# Patient Record
(demographics unavailable — no encounter records)

---

## 2024-10-17 NOTE — REASON FOR VISIT
[Patient] : Patient [Other: _____] : [unfilled] [TextBox_17] : Son [FreeTextEntry1] : med management/ follow-up

## 2024-10-17 NOTE — FAMILY HISTORY
[FreeTextEntry1] : Family hx of patient's niece who is dealing with "mental health" issue Denies all other family history of suicide, substance abuse
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
## 2024-10-17 NOTE — PLAN
[Yes. details: ___] : Yes, [unfilled] [Medication education provided] : Medication education provided. [Rationale for medication choices, possible risks/precautions, benefits, alternative treatment choices, and consequences of non-treatment discussed] : Rationale for medication choices, possible risks/precautions, benefits, alternative treatment choices, and consequences of non-treatment discussed with patient/family/caregiver  [FreeTextEntry5] : -Continue Olanzapine 10mg PO Daily for mood -Consider switching to Lybalvi or adding another medication for weight management  -F/u with therapist Holly -F/u in 6-8 weeks

## 2024-10-17 NOTE — PHYSICAL EXAM
[Well groomed] : well groomed [Cooperative] : cooperative [Euthymic] : euthymic [Full] : full [Clear] : clear [Linear/Goal Directed] : linear/goal directed [None] : none [None Reported] : none reported [Average] : average [WNL] : within normal limits [FreeTextEntry8] : "good"  [de-identified] : fair [de-identified] : fair

## 2024-10-17 NOTE — SOCIAL HISTORY
[FreeTextEntry1] : Pt is [place], , with 4 children (son-34yo, daughters- 31, 29, 26) domiciled at home with her  and her 28 y/o daughter, with her 30 y/o daughter living upstairs, Moved from [facility] 34 years ago

## 2024-11-15 NOTE — PLAN
[FreeTextEntry2] : Domain for Problem/Need I: Mental Health.    Problem: Bipolar disorder, anxiety and depression.    goal (In patient's words): "i want my mood stability and i want to go back to how i used to be happy and healthy".    Objective A: Patient will note improved awareness of triggers that result in ineffective thought, mood, behavior changes and report to the therapist and psychiatrist during sessions.    Objective B: Patient will learn to use coping skills to help manage depression and anxiety and will lower the PHQ and ARETHA 7 score by 3 points until the next treatment plan.     Problem/Need II:  Domain for Problem/Need II: Physical Health.  Problem: Bipolar disorder, anxiety and depression, cholesterol.  Objective A: Maintain annual physical and blood work, attend all appointments and take medications as prescribed.  Objective B: Go outside a much as you can, stay physical by going out for walks daily  [Cognitive and/or Behavior Therapy] : Cognitive and/or Behavior Therapy  [Motivational Interviewing] : Motivational Interviewing  [Psychoeducation] : Psychoeducation  [Supportive Therapy] : Supportive Therapy [de-identified] : Session began at 12pm and ended at 12:30pm    The patient is seen today in person individual therapy. patient is being seen by Doctor Jay Jay for psychiatric medication. Patient was referred to OPD after IPP admission at [facility].   Patient presents stable and in good mood, she is spending time with her family and has great family support from her spouse as well as her kids and grandkids. Today patient is asking to stop therapy as she states she no longer has the need to talk to this writer as she is doing very well, is happy and has her family to talk to and for support. Patient is encouraged to continue to see the psychiatrist and to take her medications as prescribed and is encouraged to call this writer in future if she needs a session.      During today's session, motivational interviewing, used CBT, reflective lesson and active listening and supportive therapy. The treatment plan will be reviewed and completed with the patient during the next session. Treatment plan completed with the patient. Her goal is to remain stable and healthy, to learn coping skills to reduce anxiety and depression.          Patient denies S/H/I or self harming behaviors.    [FreeTextEntry1] : Patient will be seen weekly for now.
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
## 2024-11-15 NOTE — PHYSICAL EXAM
[Cooperative] : cooperative [Anxious] : anxious [Full] : full [Clear] : clear [Racing] : racing [Preoccupations/Ruminations] : preoccupations/ruminations [Average] : average [WNL] : within normal limits [Mild] : mild [Individual reports tobacco use during the last 30 days?] : Individual reports tobacco use during the last 30 days? Yes [Individual reports use of the following tobacco products during the last 30 days?] : Individual reports use of the following tobacco products during the last 30 days? Yes -  [Cigarettes] : Cigarettes [Individual reports current use of tobacco cessation medication or nicotine replacement therapy?] : Individual reports current use of tobacco cessation medication or nicotine replacement therapy? No [Was tobacco cessation medication and/or nicotine replacement therapy recommended?] : Was tobacco cessation medication and/or nicotine replacement therapy recommended? Yes [Does individual accept referral to MD for cessation medication or NRT?] : Does individual accept referral to MD for cessation medication or NRT? No

## 2024-11-18 NOTE — DISCUSSION/SUMMARY
[FreeTextEntry1] : Patient seen today for therapy session. patient is doing well and states she no longer wishes to attend therapy sessions as she is doing well. she states she will continue to see the doctor for med management. Doctor is aware- patient will be meds only for now.

## 2024-12-05 NOTE — SOCIAL HISTORY
[FreeTextEntry1] : Pt is [place], , with 4 children (son-32yo, daughters- 31, 29, 26) domiciled at home with her  and her 30 y/o daughter, with her 30 y/o daughter living upstairs, Moved from [facility] 34 years ago

## 2024-12-05 NOTE — PLAN
[Yes. details: ___] : Yes, [unfilled] [Medication education provided] : Medication education provided. [Rationale for medication choices, possible risks/precautions, benefits, alternative treatment choices, and consequences of non-treatment discussed] : Rationale for medication choices, possible risks/precautions, benefits, alternative treatment choices, and consequences of non-treatment discussed with patient/family/caregiver  [FreeTextEntry5] : -Increase to Olanzapine 15mg PO Daily for mood -Consider switching to Lybalvi or adding another medication for weight management  -F/u with therapist Holly -F/u in 1-2 weeks
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
## 2024-12-05 NOTE — PHYSICAL EXAM
[Well groomed] : well groomed [Cooperative] : cooperative [Depressed] : depressed [Anxious] : anxious [Constricted] : constricted [Clear] : clear [Linear/Goal Directed] : linear/goal directed [None] : none [None Reported] : none reported [Average] : average [WNL] : within normal limits [FreeTextEntry8] : "not good" [de-identified] : fair [de-identified] : fair
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
## 2024-12-05 NOTE — DISCUSSION/SUMMARY
[FreeTextEntry1] : Patient is in person seeing Dr Goyal, as per Dr Goyal she isn't doing well, is depressed, having passive S/I thoughts, afraid of the future. Dr Goyal invited me to join video session to talk to the patient and offer a session. Writer will be seeing the patient on Dec 10 at 3 pm in person. Last time patient ended therapy with this writer as she was doing well but now is open to restart.

## 2024-12-12 NOTE — SOCIAL HISTORY
[FreeTextEntry1] : Pt is Irish, , with 4 children (son-32yo, daughters- 31, 29, 26) domiciled at home with her  and her 28 y/o daughter, with her 32 y/o daughter living upstairs, Moved from [facility] 34 years ago

## 2024-12-12 NOTE — PHYSICAL EXAM
[Well groomed] : well groomed [Cooperative] : cooperative [Depressed] : depressed [Constricted] : constricted [Clear] : clear [Linear/Goal Directed] : linear/goal directed [None] : none [None Reported] : none reported [Average] : average [WNL] : within normal limits [FreeTextEntry8] : "I feel jealous" [de-identified] : fair [de-identified] : fair
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
## 2024-12-12 NOTE — PLAN
[Yes. details: ___] : Yes, [unfilled] [Medication education provided] : Medication education provided. [Rationale for medication choices, possible risks/precautions, benefits, alternative treatment choices, and consequences of non-treatment discussed] : Rationale for medication choices, possible risks/precautions, benefits, alternative treatment choices, and consequences of non-treatment discussed with patient/family/caregiver  [FreeTextEntry5] : -Increase to Olanzapine 20mg PO Daily for mood -If pt doesn't improve can change to another medication or add mood stabilizer  -Consider switching to Lybalvi or adding another medication for weight management if pt stays on Olanzapine -F/u Labs and EKG  -F/u with therapist Holly -F/u in 1-2 weeks
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
## 2024-12-25 NOTE — DISCUSSION/SUMMARY
[FreeTextEntry1] : Jacque received notification that patient went to ER yesterday. Writer spoke to her son this morning who stated that patient is extremely anxious, cant sit still and asked to go in. While at ER they prescribed her Hydroxyzine. Patient is being seen by this writer tomorrow morning.

## 2025-01-02 NOTE — SOCIAL HISTORY
[FreeTextEntry1] : Pt is Persian, , with 4 children (son-34yo, daughters- 31, 29, 26) domiciled at home with her  and her 28 y/o daughter, with her 30 y/o daughter living upstairs, Moved from [facility] 34 years ago

## 2025-01-02 NOTE — SOCIAL HISTORY
[FreeTextEntry1] : Pt is Luxem[place], , with 4 children (son-34yo, daughters- 31, 29, 26) domiciled at home with her  and her 30 y/o daughter, with her 32 y/o daughter living upstairs, Moved from [facility] 34 years ago

## 2025-01-02 NOTE — SOCIAL HISTORY
[FreeTextEntry1] : Pt is Pashto, , with 4 children (son-32yo, daughters- 31, 29, 26) domiciled at home with her  and her 28 y/o daughter, with her 30 y/o daughter living upstairs, Moved from [facility] 34 years ago

## 2025-01-02 NOTE — PHYSICAL EXAM
[Well groomed] : well groomed [Cooperative] : cooperative [Depressed] : depressed [Constricted] : constricted [Clear] : clear [Linear/Goal Directed] : linear/goal directed [None] : none [None Reported] : none reported [WNL] : within normal limits [Average] : average [FreeTextEntry8] : "I feel jealous" [de-identified] : fair [de-identified] : fair

## 2025-01-02 NOTE — PLAN
[Yes. details: ___] : Yes, [unfilled] [Medication education provided] : Medication education provided. [Rationale for medication choices, possible risks/precautions, benefits, alternative treatment choices, and consequences of non-treatment discussed] : Rationale for medication choices, possible risks/precautions, benefits, alternative treatment choices, and consequences of non-treatment discussed with patient/family/caregiver  [FreeTextEntry5] : -Continue Olanzapine 20mg PO Daily for mood -Discontinue Lexparo 10mg PO Daily -Start Lithium 300mg PO BID for mood       -F/u Lithium level in 5 days -Start Colace 100mg PO Bedtime for constipation -F/u with therapist Ar[place] -F/u in 1-2 weeks

## 2025-01-02 NOTE — PHYSICAL EXAM
[Well groomed] : well groomed [Cooperative] : cooperative [Depressed] : depressed [Constricted] : constricted [Clear] : clear [Linear/Goal Directed] : linear/goal directed [None] : none [None Reported] : none reported [WNL] : within normal limits [Average] : average [FreeTextEntry8] : "I feel jealous" [de-identified] : fair [de-identified] : fair

## 2025-01-02 NOTE — SOCIAL HISTORY
[FreeTextEntry1] : Pt is [place], , with 4 children (son-34yo, daughters- 31, 29, 26) domiciled at home with her  and her 28 y/o daughter, with her 32 y/o daughter living upstairs, Moved from [facility] 34 years ago
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
## 2025-01-02 NOTE — PHYSICAL EXAM
[Well groomed] : well groomed [Cooperative] : cooperative [Depressed] : depressed [Constricted] : constricted [Clear] : clear [Linear/Goal Directed] : linear/goal directed [None] : none [None Reported] : none reported [WNL] : within normal limits [Average] : average [FreeTextEntry8] : "I feel jealous" [de-identified] : fair [de-identified] : fair

## 2025-01-02 NOTE — PHYSICAL EXAM
[Well groomed] : well groomed [Cooperative] : cooperative [Depressed] : depressed [Constricted] : constricted [Clear] : clear [Linear/Goal Directed] : linear/goal directed [None] : none [None Reported] : none reported [WNL] : within normal limits [Average] : average [FreeTextEntry8] : "I feel jealous" [de-identified] : fair [de-identified] : fair

## 2025-01-09 NOTE — DISCUSSION/SUMMARY
[FreeTextEntry1] : Pt's daughter Reagan ([phone]) called writer to inform writer that she is still trying to get in contact with pt's PMD to change her Losartan to another antihypertensive. Pt's daughter states that patient has not been doing well again. States patient appears to be very frightened and anxious. Pt had reported flight of ideas. She denies patient being a safety risk to herself or others. Writer discussed with patient about calling 911 or taking patient to the hospital if she feels pt becomes a safety risk to herself or others. Pt's daughter verbalized understanding. It was discussed to discontinue Escitalopram. Depakote was also discussed as an option, however they would like to try Lithium and will get her antihypertensive switched. Writer to also send a 9-day supply of Klonopin 0.5mg PO Daily PRN for anxiety. They are still going on vacation this week and will be returning next week. Daughter states that her mother loves the beach and she feels it would be helpful for her mother.

## 2025-01-09 NOTE — DISCUSSION/SUMMARY
[FreeTextEntry1] : Pt's daughter Reagan ([phone]) called writer to inform writer that she is still trying to get in contact with pt's PMD to change her Losartan to another antihypertensive. Pt's daughter states that patient has not been doing well again. States patient appears to be very frightened and anxious. Pt had reported flight of ideas. She denies patient being a safety risk to herself or others. Writer discussed with patient about calling 911 or taking patient to the hospital if she feels pt becomes a safety risk to herself or others. Pt's daughter verbalized understanding. It was discussed to discontinue Escitalopram. Depakote was also discussed as an option, however they would like to try Lithium and will get her antihypertensive switched. Writer to also send a 9-day supply of Klonopin 0.5mg PO Daily PRN for anxiety. They are still going on vacation this week and will be returning next week. Daughter states that her mother loves the beach and she feels it would be helpful for her mother.

## 2025-01-09 NOTE — DISCUSSION/SUMMARY
[FreeTextEntry1] : Pt's daughter Reagan ([phone]) called writer to inform writer that she is still trying to get in contact with pt's PMD to change her Losartan to another antihypertensive. Pt's daughter states that patient has not been doing well again. States patient appears to be very frightened and anxious. Pt had reported flight of ideas. She denies patient being a safety risk to herself or others. Writer discussed with patient about calling 911 or taking patient to the hospital if she feels pt becomes a safety risk to herself or others. Pt's daughter verbalized understanding. It was discussed to discontinue Escitalopram. Depakote was also discussed as an option, however they would like to try Lithium and will get her antihypertensive switched. Writer to also send a 9-day supply of Klonopin 0.5mg PO Daily PRN for anxiety. They are still going on vacation this week and will be returning next week. Daughter states that her mother loves the beach and she feels it would be helpful for her mother.

## 2025-01-09 NOTE — DISCUSSION/SUMMARY
[FreeTextEntry1] : Pt's daughter Reagan ([phone]) called writer to inform writer that she is still trying to get in contact with pt's PMD to change her Losartan to another antihypertensive. Pt's daughter states that patient has not been doing well again. States patient appears to be very frightened and anxious. Pt had reported flight of ideas. She denies patient being a safety risk to herself or others. Writer discussed with patient about calling 911 or taking patient to the hospital if she feels pt becomes a safety risk to herself or others. Pt's daughter verbalized understanding. It was discussed to discontinue Escitalopram. Depakote was also discussed as an option, however they would like to try Lithium and will get her antihypertensive switched. Writer to also send a 9-day supply of Klonopin 0.5mg PO Daily PRN for anxiety. They are still going on vacation this week and will be returning next week. Daughter states that her mother loves the beach and she feels it would be helpful for her mother.
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
## 2025-01-13 NOTE — PLAN
[FreeTextEntry2] : Domain for Problem/Need I: Mental Health.    Problem: Bipolar disorder, anxiety and depression.    goal (In patient's words): "i want my mood stability and i want to go back to how i used to be happy and healthy".    Objective A: Patient will note improved awareness of triggers that result in ineffective thought, mood, behavior changes and report to the therapist and psychiatrist during sessions.    Objective B: Patient will learn to use coping skills to help manage depression and anxiety and will lower the PHQ and ARETHA 7 score by 3 points until the next treatment plan.     Problem/Need II:  Domain for Problem/Need II: Physical Health.  Problem: Bipolar disorder, anxiety and depression, cholesterol.  Objective A: Maintain annual physical and blood work, attend all appointments and take medications as prescribed.  Objective B: Go outside a much as you can, stay physical by going out for walks daily  [Cognitive and/or Behavior Therapy] : Cognitive and/or Behavior Therapy  [Motivational Interviewing] : Motivational Interviewing  [Psychoeducation] : Psychoeducation  [Supportive Therapy] : Supportive Therapy [de-identified] : Session began at 11am and ended at 11:45am      The patient is seen today for individual therapy post IPP hospitalization. Patient was supposed to see her sooner but canceled because she went on vacation to [facility] and returned home last night. Patient states she is taking her medications as prescribed. She is still feeling depressed and anxious, agitated and "all over the place, can't sit still" patient reports she gets fixated in things and cannot let them go. Patient reports she has been attempting to clean and cook with great difficulty due to lack of motivation. She reports that she is signing up at [facility] and plans to go to the gym daily using their swimming pool and saunas. In addition, patient visits her son and his family and goes for walks but limited because its cold and the park she likes to go to is closed for construction. Deep breathing practice exercised during sessions, she found it helpful as it calmed her down, self-positive talk encouraged patient receptive she will try to challenge her thoughts. During the end of session her son joined and encouraged the patient to come see the writer weekly, patient agrees.       During today's session, I did motivational interviewing, used CBT, reflective lesson and active listening and supportive therapy. The treatment plan will be reviewed and completed with the patient during the next session. Treatment plan completed with the patient. Her goal is to remain stable and healthy, to learn coping skills to reduce anxiety and depression.     [FreeTextEntry1] : Patient agrees to come to weekly sessions.

## 2025-01-13 NOTE — PLAN
[FreeTextEntry2] : Domain for Problem/Need I: Mental Health.    Problem: Bipolar disorder, anxiety and depression.    goal (In patient's words): "i want my mood stability and i want to go back to how i used to be happy and healthy".    Objective A: Patient will note improved awareness of triggers that result in ineffective thought, mood, behavior changes and report to the therapist and psychiatrist during sessions.    Objective B: Patient will learn to use coping skills to help manage depression and anxiety and will lower the PHQ and ARETHA 7 score by 3 points until the next treatment plan.     Problem/Need II:  Domain for Problem/Need II: Physical Health.  Problem: Bipolar disorder, anxiety and depression, cholesterol.  Objective A: Maintain annual physical and blood work, attend all appointments and take medications as prescribed.  Objective B: Go outside a much as you can, stay physical by going out for walks daily  [Cognitive and/or Behavior Therapy] : Cognitive and/or Behavior Therapy  [Motivational Interviewing] : Motivational Interviewing  [Psychoeducation] : Psychoeducation  [Supportive Therapy] : Supportive Therapy [de-identified] : Session began at 11am and ended at 11:45am      The patient is seen today for individual therapy post IPP hospitalization. Patient was supposed to see her sooner but canceled because she went on vacation to [facility] and returned home last night. Patient states she is taking her medications as prescribed. She is still feeling depressed and anxious, agitated and "all over the place, can't sit still" patient reports she gets fixated in things and cannot let them go. Patient reports she has been attempting to clean and cook with great difficulty due to lack of motivation. She reports that she is signing up at [facility] and plans to go to the gym daily using their swimming pool and saunas. In addition, patient visits her son and his family and goes for walks but limited because its cold and the park she likes to go to is closed for construction. Deep breathing practice exercised during sessions, she found it helpful as it calmed her down, self-positive talk encouraged patient receptive she will try to challenge her thoughts. During the end of session her son joined and encouraged the patient to come see the writer weekly, patient agrees.       During today's session, I did motivational interviewing, used CBT, reflective lesson and active listening and supportive therapy. The treatment plan will be reviewed and completed with the patient during the next session. Treatment plan completed with the patient. Her goal is to remain stable and healthy, to learn coping skills to reduce anxiety and depression.     [FreeTextEntry1] : Patient agrees to come to weekly sessions.

## 2025-01-13 NOTE — PLAN
[FreeTextEntry2] : Domain for Problem/Need I: Mental Health.    Problem: Bipolar disorder, anxiety and depression.    goal (In patient's words): "i want my mood stability and i want to go back to how i used to be happy and healthy".    Objective A: Patient will note improved awareness of triggers that result in ineffective thought, mood, behavior changes and report to the therapist and psychiatrist during sessions.    Objective B: Patient will learn to use coping skills to help manage depression and anxiety and will lower the PHQ and ARETHA 7 score by 3 points until the next treatment plan.     Problem/Need II:  Domain for Problem/Need II: Physical Health.  Problem: Bipolar disorder, anxiety and depression, cholesterol.  Objective A: Maintain annual physical and blood work, attend all appointments and take medications as prescribed.  Objective B: Go outside a much as you can, stay physical by going out for walks daily  [Cognitive and/or Behavior Therapy] : Cognitive and/or Behavior Therapy  [Motivational Interviewing] : Motivational Interviewing  [Psychoeducation] : Psychoeducation  [Supportive Therapy] : Supportive Therapy [de-identified] : Session began at 11am and ended at 11:45am      The patient is seen today for individual therapy post IPP hospitalization. Patient was supposed to see her sooner but canceled because she went on vacation to [facility] and returned home last night. Patient states she is taking her medications as prescribed. She is still feeling depressed and anxious, agitated and "all over the place, can't sit still" patient reports she gets fixated in things and cannot let them go. Patient reports she has been attempting to clean and cook with great difficulty due to lack of motivation. She reports that she is signing up at [facility] and plans to go to the gym daily using their swimming pool and saunas. In addition, patient visits her son and his family and goes for walks but limited because its cold and the park she likes to go to is closed for construction. Deep breathing practice exercised during sessions, she found it helpful as it calmed her down, self-positive talk encouraged patient receptive she will try to challenge her thoughts. During the end of session her son joined and encouraged the patient to come see the writer weekly, patient agrees.       During today's session, I did motivational interviewing, used CBT, reflective lesson and active listening and supportive therapy. The treatment plan will be reviewed and completed with the patient during the next session. Treatment plan completed with the patient. Her goal is to remain stable and healthy, to learn coping skills to reduce anxiety and depression.     [FreeTextEntry1] : Patient agrees to come to weekly sessions.

## 2025-01-13 NOTE — PLAN
[FreeTextEntry2] : Domain for Problem/Need I: Mental Health.    Problem: Bipolar disorder, anxiety and depression.    goal (In patient's words): "i want my mood stability and i want to go back to how i used to be happy and healthy".    Objective A: Patient will note improved awareness of triggers that result in ineffective thought, mood, behavior changes and report to the therapist and psychiatrist during sessions.    Objective B: Patient will learn to use coping skills to help manage depression and anxiety and will lower the PHQ and ARETHA 7 score by 3 points until the next treatment plan.     Problem/Need II:  Domain for Problem/Need II: Physical Health.  Problem: Bipolar disorder, anxiety and depression, cholesterol.  Objective A: Maintain annual physical and blood work, attend all appointments and take medications as prescribed.  Objective B: Go outside a much as you can, stay physical by going out for walks daily  [Cognitive and/or Behavior Therapy] : Cognitive and/or Behavior Therapy  [Motivational Interviewing] : Motivational Interviewing  [Psychoeducation] : Psychoeducation  [Supportive Therapy] : Supportive Therapy [de-identified] : Session began at 11am and ended at 11:45am      The patient is seen today for individual therapy post IPP hospitalization. Patient was supposed to see her sooner but canceled because she went on vacation to [facility] and returned home last night. Patient states she is taking her medications as prescribed. She is still feeling depressed and anxious, agitated and "all over the place, can't sit still" patient reports she gets fixated in things and cannot let them go. Patient reports she has been attempting to clean and cook with great difficulty due to lack of motivation. She reports that she is signing up at [facility] and plans to go to the gym daily using their swimming pool and saunas. In addition, patient visits her son and his family and goes for walks but limited because its cold and the park she likes to go to is closed for construction. Deep breathing practice exercised during sessions, she found it helpful as it calmed her down, self-positive talk encouraged patient receptive she will try to challenge her thoughts. During the end of session her son joined and encouraged the patient to come see the writer weekly, patient agrees.       During today's session, I did motivational interviewing, used CBT, reflective lesson and active listening and supportive therapy. The treatment plan will be reviewed and completed with the patient during the next session. Treatment plan completed with the patient. Her goal is to remain stable and healthy, to learn coping skills to reduce anxiety and depression.     [FreeTextEntry1] : Patient agrees to come to weekly sessions.

## 2025-01-13 NOTE — REASON FOR VISIT
[Patient] : Patient [Post-Hospitalization Visit] : This is a post-hospitalization visit [FreeTextEntry1] : Individual therapy
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
## 2025-01-15 NOTE — SOCIAL HISTORY
[FreeTextEntry1] : Pt is Hebrew, , with 4 children (son-34yo, daughters- 31, 29, 26) domiciled at home with her  and her 30 y/o daughter, with her 32 y/o daughter living upstairs, Moved from [facility] [place] years ago

## 2025-01-15 NOTE — PHYSICAL EXAM
[Well groomed] : well groomed [Cooperative] : cooperative [Depressed] : depressed [Constricted] : constricted [Clear] : clear [Linear/Goal Directed] : linear/goal directed [None] : none [None Reported] : none reported [WNL] : within normal limits [Average] : average [de-identified] : less [de-identified] : fair [de-identified] : fair

## 2025-01-15 NOTE — PLAN
[Yes. details: ___] : Yes, [unfilled] [Medication education provided] : Medication education provided. [Rationale for medication choices, possible risks/precautions, benefits, alternative treatment choices, and consequences of non-treatment discussed] : Rationale for medication choices, possible risks/precautions, benefits, alternative treatment choices, and consequences of non-treatment discussed with patient/family/caregiver  [FreeTextEntry5] : -Continue Olanzapine 20mg PO Daily for mood -Continue Lithium 300mg PO BID for mood- will increase after lithium level is done       -F/u Lithium level  -Increase Klonopin to 0.5mg PO BID PRN for anxiety  -Continue Colace 100mg PO Bedtime for constipation -F/u with therapist Holly -F/u in 1-2 weeks

## 2025-01-16 NOTE — DISCUSSION/SUMMARY
[1. Helpful Person/Contact Number: _____] : 1. Helpful Person/Contact Number: [unfilled] [a. Clinician Name/Contact Information: _____] : Clinician Name/Contact Information: [unfilled] [b. Clinician Name/Contact Information: _____] : Clinician Name/Contact Information: [unfilled] [c. Local ED/Urgent Care Services/Hotlines (Name/Address/Phone):] : Local ED/Urgent Care Services/Hotlines (Name/Address/Phone): [d. Suicide Prevention Lifeline Phone: [phone]-TALK ([phone]) ] : Suicide Prevention Lifeline Phone: 6-977-508-HFPK ([phone])  [e. Suicide Prevention “Text the word [id number]”] : e. Suicide Prevention "Text the word [id number]"  [f. Suicide Prevention - LGBTQ Specific Phone: [phone]] : Suicide Prevention - LGBTQ Specific Phone: [phone] [g. Suicide & Crisis Lifeline - send a text or make a call to 988] : Suicide & Crisis Lifeline - send a text or make a call to 988 [de-identified] : "my family" [Plan Review] : Plan Review [Adherent to treatment recommendations] : adherent to treatment recommendations [Insightful] : insightful [Motivated to participate in treatment] : motivated to participate in treatment [Motivated to maintain or improve physical health] : motivated to maintain or improve physical health [In good health] : in good health [Financially stable] : financially stable [Part of a supportive marriage] : part of a supportive marriage [Part of a supportive family] : part of a supportive family [Involved in cultural/spiritual/Christianity/community activities] : involved in cultural/spiritual/Christianity/community activities [Access to safe outdoor spaces] : access to safe outdoor spaces [FreeTextEntry2] : 1/13/2026 [FreeTextEntry3] : 7/22/2024-plan review post IPP discharge [FreeTextEntry7] : Plan review post IPP discharge [de-identified] : Dr Barone/ [place] [Mental Health] : Mental Health [Physical Health] : Physical Health [Continued - Progress made] : Continued - Progress made: [every ___ months] : every [unfilled] months [every ___ weeks] : every [unfilled] weeks [Improvement in symptoms as evidenced by:] : Improvement in symptoms as evidenced by: [Other rationale for transition/discharge:] : Other rationale for transition/discharge: [None - Reason others did not participate:] : None - Reason others did not participate:  [Yes] : Yes [Psychiatric Provider/Prescriber] : Psychiatric Provider/Prescriber [Therapist] : Therapist [Supervisor (if needed)] : Supervisor [FreeTextEntry1] : Bipolar disorder, anxiety and depression, hypertension [FreeTextEntry4] : "I want to maintain being healthy with the help of medication and providers [de-identified] : Patient will attempt to follow up with her providers and take medication as prescribed. [de-identified] : Maintain annual physicals and blood work, attend all appointments and take medications as prescribed.  [de-identified] : 1/13/2026 [de-identified] : Patient continues to adhere the treatment plan. [de-identified] : Go outside a much as you can, stay physical by going out for walks daily [de-identified] : 1/13/2026 [de-identified] : Patient will report weekly that she is going out for walks and staying active to maintain healthy coping skills both physical and mental [FreeTextEntry5] : CBT [de-identified] : Dr Barone/ Dr Goyal [de-identified] : Holly Olea LMSW in person  [de-identified] : Improvement in symptoms as evidenced by: The patient expresses improvement in performing activities of daily living and/or says progress with initial complaint symptoms. In addition, the treatment team will conduct diagnose-based screenings as needed. [de-identified] : Other rationale for transition/discharge: Other rationales for transition/discharge are granted/applied upon the patient DNC, relocate, self-termination, and/or requests for the treatment termination/transfer to another facility. [de-identified] : Patient declined [Tobacco Screening Completed?] : Tobacco Screening Completed: Yes

## 2025-01-16 NOTE — DISCUSSION/SUMMARY
[1. Helpful Person/Contact Number: _____] : 1. Helpful Person/Contact Number: [unfilled] [a. Clinician Name/Contact Information: _____] : Clinician Name/Contact Information: [unfilled] [b. Clinician Name/Contact Information: _____] : Clinician Name/Contact Information: [unfilled] [c. Local ED/Urgent Care Services/Hotlines (Name/Address/Phone):] : Local ED/Urgent Care Services/Hotlines (Name/Address/Phone): [d. Suicide Prevention Lifeline Phone: 3-445-053-TALK ([phone]) ] : Suicide Prevention Lifeline Phone: 7-185-210-GEXW ([phone])  [e. Suicide Prevention “Text the word [id number]”] : e. Suicide Prevention "Text the word [id number]"  [f. Suicide Prevention - LGBTQ Specific Phone: [phone]] : Suicide Prevention - LGBTQ Specific Phone: [phone] [g. Suicide & Crisis Lifeline - send a text or make a call to 988] : Suicide & Crisis Lifeline - send a text or make a call to 988 [de-identified] : "my family" [Plan Review] : Plan Review [Adherent to treatment recommendations] : adherent to treatment recommendations [Insightful] : insightful [Motivated to participate in treatment] : motivated to participate in treatment [Motivated to maintain or improve physical health] : motivated to maintain or improve physical health [In good health] : in good health [Financially stable] : financially stable [Part of a supportive marriage] : part of a supportive marriage [Part of a supportive family] : part of a supportive family [Involved in cultural/spiritual/Protestant/community activities] : involved in cultural/spiritual/Protestant/community activities [Access to safe outdoor spaces] : access to safe outdoor spaces [FreeTextEntry2] : 1/13/2026 [FreeTextEntry3] : 7/22/2024-plan review post IPP discharge [FreeTextEntry7] : Plan review post IPP discharge [de-identified] : Dr Barone/ [place] [Mental Health] : Mental Health [Physical Health] : Physical Health [Continued - Progress made] : Continued - Progress made: [every ___ months] : every [unfilled] months [every ___ weeks] : every [unfilled] weeks [Improvement in symptoms as evidenced by:] : Improvement in symptoms as evidenced by: [Other rationale for transition/discharge:] : Other rationale for transition/discharge: [None - Reason others did not participate:] : None - Reason others did not participate:  [Yes] : Yes [Psychiatric Provider/Prescriber] : Psychiatric Provider/Prescriber [Therapist] : Therapist [Supervisor (if needed)] : Supervisor [FreeTextEntry1] : Bipolar disorder, anxiety and depression, hypertension [FreeTextEntry4] : "I want to maintain being healthy with the help of medication and providers [de-identified] : Patient will attempt to follow up with her providers and take medication as prescribed. [de-identified] : Maintain annual physicals and blood work, attend all appointments and take medications as prescribed.  [de-identified] : 1/13/2026 [de-identified] : Patient continues to adhere the treatment plan. [de-identified] : Go outside a much as you can, stay physical by going out for walks daily [de-identified] : 1/13/2026 [de-identified] : Patient will report weekly that she is going out for walks and staying active to maintain healthy coping skills both physical and mental [FreeTextEntry5] : CBT [de-identified] : Dr Barone/ Dr Goyal [de-identified] : Holly Olea LMSW in person  [de-identified] : Improvement in symptoms as evidenced by: The patient expresses improvement in performing activities of daily living and/or says progress with initial complaint symptoms. In addition, the treatment team will conduct diagnose-based screenings as needed. [de-identified] : Other rationale for transition/discharge: Other rationales for transition/discharge are granted/applied upon the patient DNC, relocate, self-termination, and/or requests for the treatment termination/transfer to another facility. [de-identified] : Patient declined [Tobacco Screening Completed?] : Tobacco Screening Completed: Yes

## 2025-01-16 NOTE — DISCUSSION/SUMMARY
[1. Helpful Person/Contact Number: _____] : 1. Helpful Person/Contact Number: [unfilled] [a. Clinician Name/Contact Information: _____] : Clinician Name/Contact Information: [unfilled] [b. Clinician Name/Contact Information: _____] : Clinician Name/Contact Information: [unfilled] [c. Local ED/Urgent Care Services/Hotlines (Name/Address/Phone):] : Local ED/Urgent Care Services/Hotlines (Name/Address/Phone): [d. Suicide Prevention Lifeline Phone: 2-733-680-TALK ([phone]) ] : Suicide Prevention Lifeline Phone: [phone]-HSUA ([phone])  [e. Suicide Prevention “Text the word AAP7 pp 829714”] : e. Suicide Prevention "Text the word YUD3 tq 917821"  [f. Suicide Prevention - LGBTQ Specific Phone: [phone]] : Suicide Prevention - LGBTQ Specific Phone: [phone] [g. Suicide & Crisis Lifeline - send a text or make a call to 988] : Suicide & Crisis Lifeline - send a text or make a call to 988 [de-identified] : "my family" [Plan Review] : Plan Review [Adherent to treatment recommendations] : adherent to treatment recommendations [Insightful] : insightful [Motivated to participate in treatment] : motivated to participate in treatment [Motivated to maintain or improve physical health] : motivated to maintain or improve physical health [In good health] : in good health [Financially stable] : financially stable [Part of a supportive marriage] : part of a supportive marriage [Part of a supportive family] : part of a supportive family [Involved in cultural/spiritual/Scientology/community activities] : involved in cultural/spiritual/Scientology/community activities [Access to safe outdoor spaces] : access to safe outdoor spaces [FreeTextEntry2] : 1/13/2026 [FreeTextEntry3] : 7/22/2024-plan review post IPP discharge [FreeTextEntry7] : Plan review post IPP discharge [de-identified] : Dr Barone/ Lore [Mental Health] : Mental Health [Physical Health] : Physical Health [Continued - Progress made] : Continued - Progress made: [every ___ months] : every [unfilled] months [every ___ weeks] : every [unfilled] weeks [Improvement in symptoms as evidenced by:] : Improvement in symptoms as evidenced by: [Other rationale for transition/discharge:] : Other rationale for transition/discharge: [None - Reason others did not participate:] : None - Reason others did not participate:  [Yes] : Yes [Psychiatric Provider/Prescriber] : Psychiatric Provider/Prescriber [Therapist] : Therapist [Supervisor (if needed)] : Supervisor [FreeTextEntry1] : Bipolar disorder, anxiety and depression, hypertension [FreeTextEntry4] : "I want to maintain being healthy with the help of medication and providers [de-identified] : Patient will attempt to follow up with her providers and take medication as prescribed. [de-identified] : Maintain annual physicals and blood work, attend all appointments and take medications as prescribed.  [de-identified] : 1/13/2026 [de-identified] : Patient continues to adhere the treatment plan. [de-identified] : Go outside a much as you can, stay physical by going out for walks daily [de-identified] : 1/13/2026 [de-identified] : Patient will report weekly that she is going out for walks and staying active to maintain healthy coping skills both physical and mental [FreeTextEntry5] : CBT [de-identified] : Dr Barone/ Dr Goyal [de-identified] : Holly Olea LMSW in person  [de-identified] : Improvement in symptoms as evidenced by: The patient expresses improvement in performing activities of daily living and/or says progress with initial complaint symptoms. In addition, the treatment team will conduct diagnose-based screenings as needed. [de-identified] : Other rationale for transition/discharge: Other rationales for transition/discharge are granted/applied upon the patient DNC, relocate, self-termination, and/or requests for the treatment termination/transfer to another facility. [de-identified] : Patient declined [Tobacco Screening Completed?] : Tobacco Screening Completed: Yes

## 2025-01-16 NOTE — DISCUSSION/SUMMARY
[1. Helpful Person/Contact Number: _____] : 1. Helpful Person/Contact Number: [unfilled] [a. Clinician Name/Contact Information: _____] : Clinician Name/Contact Information: [unfilled] [b. Clinician Name/Contact Information: _____] : Clinician Name/Contact Information: [unfilled] [c. Local ED/Urgent Care Services/Hotlines (Name/Address/Phone):] : Local ED/Urgent Care Services/Hotlines (Name/Address/Phone): [d. Suicide Prevention Lifeline Phone: 3-718-138-TALK ([phone]) ] : Suicide Prevention Lifeline Phone: [phone] ([phone])  [e. Suicide Prevention “Text the word DSG1 we 913090”] : e. Suicide Prevention "Text the word VGM6 od 959757"  [f. Suicide Prevention - LGBTQ Specific Phone: [phone]] : Suicide Prevention - LGBTQ Specific Phone: [phone] [g. Suicide & Crisis Lifeline - send a text or make a call to 988] : Suicide & Crisis Lifeline - send a text or make a call to 988 [de-identified] : "my family" [Plan Review] : Plan Review [Adherent to treatment recommendations] : adherent to treatment recommendations [Insightful] : insightful [Motivated to participate in treatment] : motivated to participate in treatment [Motivated to maintain or improve physical health] : motivated to maintain or improve physical health [In good health] : in good health [Financially stable] : financially stable [Part of a supportive marriage] : part of a supportive marriage [Part of a supportive family] : part of a supportive family [Involved in cultural/spiritual/Latter-day/community activities] : involved in cultural/spiritual/Latter-day/community activities [Access to safe outdoor spaces] : access to safe outdoor spaces [FreeTextEntry2] : 1/13/2026 [FreeTextEntry3] : 7/22/2024-plan review post IPP discharge [FreeTextEntry7] : Plan review post IPP discharge [de-identified] : Dr Barone/ Lore [Mental Health] : Mental Health [Physical Health] : Physical Health [Continued - Progress made] : Continued - Progress made: [every ___ months] : every [unfilled] months [every ___ weeks] : every [unfilled] weeks [Improvement in symptoms as evidenced by:] : Improvement in symptoms as evidenced by: [Other rationale for transition/discharge:] : Other rationale for transition/discharge: [None - Reason others did not participate:] : None - Reason others did not participate:  [Yes] : Yes [Psychiatric Provider/Prescriber] : Psychiatric Provider/Prescriber [Therapist] : Therapist [Supervisor (if needed)] : Supervisor [FreeTextEntry1] : Bipolar disorder, anxiety and depression, hypertension [FreeTextEntry4] : "I want to maintain being healthy with the help of medication and providers [de-identified] : Patient will attempt to follow up with her providers and take medication as prescribed. [de-identified] : Maintain annual physicals and blood work, attend all appointments and take medications as prescribed.  [de-identified] : 1/13/2026 [de-identified] : Patient continues to adhere the treatment plan. [de-identified] : Go outside a much as you can, stay physical by going out for walks daily [de-identified] : 1/13/2026 [de-identified] : Patient will report weekly that she is going out for walks and staying active to maintain healthy coping skills both physical and mental [FreeTextEntry5] : CBT [de-identified] : Dr Barone/ Dr Goyal [de-identified] : Holly Olea LMSW in person  [de-identified] : Improvement in symptoms as evidenced by: The patient expresses improvement in performing activities of daily living and/or says progress with initial complaint symptoms. In addition, the treatment team will conduct diagnose-based screenings as needed. [de-identified] : Other rationale for transition/discharge: Other rationales for transition/discharge are granted/applied upon the patient DNC, relocate, self-termination, and/or requests for the treatment termination/transfer to another facility. [de-identified] : Patient declined [Tobacco Screening Completed?] : Tobacco Screening Completed: Yes

## 2025-01-16 NOTE — DISCUSSION/SUMMARY
[1. Helpful Person/Contact Number: _____] : 1. Helpful Person/Contact Number: [unfilled] [a. Clinician Name/Contact Information: _____] : Clinician Name/Contact Information: [unfilled] [b. Clinician Name/Contact Information: _____] : Clinician Name/Contact Information: [unfilled] [c. Local ED/Urgent Care Services/Hotlines (Name/Address/Phone):] : Local ED/Urgent Care Services/Hotlines (Name/Address/Phone): [d. Suicide Prevention Lifeline Phone: 9-947-181-TALK ([phone]) ] : Suicide Prevention Lifeline Phone: [phone]-BJSZ ([phone])  [e. Suicide Prevention “Text the word XEK0 xb 904564”] : e. Suicide Prevention "Text the word GBP7 wa 218402"  [f. Suicide Prevention - LGBTQ Specific Phone: [phone]] : Suicide Prevention - LGBTQ Specific Phone: [phone] [g. Suicide & Crisis Lifeline - send a text or make a call to 988] : Suicide & Crisis Lifeline - send a text or make a call to 988 [de-identified] : "my family" [Plan Review] : Plan Review [Adherent to treatment recommendations] : adherent to treatment recommendations [Insightful] : insightful [Motivated to participate in treatment] : motivated to participate in treatment [Motivated to maintain or improve physical health] : motivated to maintain or improve physical health [In good health] : in good health [Financially stable] : financially stable [Part of a supportive marriage] : part of a supportive marriage [Part of a supportive family] : part of a supportive family [Involved in cultural/spiritual/Evangelical/community activities] : involved in cultural/spiritual/Evangelical/community activities [Access to safe outdoor spaces] : access to safe outdoor spaces [FreeTextEntry2] : 1/13/2026 [FreeTextEntry3] : 7/22/2024-plan review post IPP discharge [FreeTextEntry7] : Plan review post IPP discharge [de-identified] : Dr Barone/ Lore [Mental Health] : Mental Health [Physical Health] : Physical Health [Continued - Progress made] : Continued - Progress made: [every ___ months] : every [unfilled] months [every ___ weeks] : every [unfilled] weeks [Improvement in symptoms as evidenced by:] : Improvement in symptoms as evidenced by: [Other rationale for transition/discharge:] : Other rationale for transition/discharge: [None - Reason others did not participate:] : None - Reason others did not participate:  [Yes] : Yes [Psychiatric Provider/Prescriber] : Psychiatric Provider/Prescriber [Therapist] : Therapist [Supervisor (if needed)] : Supervisor [FreeTextEntry1] : Bipolar disorder, anxiety and depression, hypertension [FreeTextEntry4] : "I want to maintain being healthy with the help of medication and providers [de-identified] : Patient will attempt to follow up with her providers and take medication as prescribed. [de-identified] : Maintain annual physicals and blood work, attend all appointments and take medications as prescribed.  [de-identified] : 1/13/2026 [de-identified] : Patient continues to adhere the treatment plan. [de-identified] : Go outside a much as you can, stay physical by going out for walks daily [de-identified] : 1/13/2026 [de-identified] : Patient will report weekly that she is going out for walks and staying active to maintain healthy coping skills both physical and mental [FreeTextEntry5] : CBT [de-identified] : Dr Barone/ Dr Goyal [de-identified] : Holly Olea LMSW in person  [de-identified] : Improvement in symptoms as evidenced by: The patient expresses improvement in performing activities of daily living and/or says progress with initial complaint symptoms. In addition, the treatment team will conduct diagnose-based screenings as needed. [de-identified] : Other rationale for transition/discharge: Other rationales for transition/discharge are granted/applied upon the patient DNC, relocate, self-termination, and/or requests for the treatment termination/transfer to another facility. [de-identified] : Patient declined [Tobacco Screening Completed?] : Tobacco Screening Completed: Yes

## 2025-01-16 NOTE — DISCUSSION/SUMMARY
[1. Helpful Person/Contact Number: _____] : 1. Helpful Person/Contact Number: [unfilled] [a. Clinician Name/Contact Information: _____] : Clinician Name/Contact Information: [unfilled] [b. Clinician Name/Contact Information: _____] : Clinician Name/Contact Information: [unfilled] [c. Local ED/Urgent Care Services/Hotlines (Name/Address/Phone):] : Local ED/Urgent Care Services/Hotlines (Name/Address/Phone): [d. Suicide Prevention Lifeline Phone: 7-194-304-TALK ([phone]) ] : Suicide Prevention Lifeline Phone: [phone]-ABYU ([phone])  [e. Suicide Prevention “Text the word LTB7 ij 114873”] : e. Suicide Prevention "Text the word WYA3 xq 941694"  [f. Suicide Prevention - LGBTQ Specific Phone: [phone]] : Suicide Prevention - LGBTQ Specific Phone: [phone] [g. Suicide & Crisis Lifeline - send a text or make a call to 988] : Suicide & Crisis Lifeline - send a text or make a call to 988 [de-identified] : "my family" [Plan Review] : Plan Review [Adherent to treatment recommendations] : adherent to treatment recommendations [Insightful] : insightful [Motivated to participate in treatment] : motivated to participate in treatment [Motivated to maintain or improve physical health] : motivated to maintain or improve physical health [In good health] : in good health [Financially stable] : financially stable [Part of a supportive marriage] : part of a supportive marriage [Part of a supportive family] : part of a supportive family [Involved in cultural/spiritual/Protestant/community activities] : involved in cultural/spiritual/Protestant/community activities [Access to safe outdoor spaces] : access to safe outdoor spaces [FreeTextEntry2] : 1/13/2026 [FreeTextEntry3] : 7/22/2024-plan review post IPP discharge [FreeTextEntry7] : Plan review post IPP discharge [de-identified] : Dr Barone/ Lore [Mental Health] : Mental Health [Physical Health] : Physical Health [Continued - Progress made] : Continued - Progress made: [every ___ months] : every [unfilled] months [every ___ weeks] : every [unfilled] weeks [Improvement in symptoms as evidenced by:] : Improvement in symptoms as evidenced by: [Other rationale for transition/discharge:] : Other rationale for transition/discharge: [None - Reason others did not participate:] : None - Reason others did not participate:  [Yes] : Yes [Psychiatric Provider/Prescriber] : Psychiatric Provider/Prescriber [Therapist] : Therapist [Supervisor (if needed)] : Supervisor [FreeTextEntry1] : Bipolar disorder, anxiety and depression, hypertension [FreeTextEntry4] : "I want to maintain being healthy with the help of medication and providers [de-identified] : Patient will attempt to follow up with her providers and take medication as prescribed. [de-identified] : Maintain annual physicals and blood work, attend all appointments and take medications as prescribed.  [de-identified] : 1/13/2026 [de-identified] : Patient continues to adhere the treatment plan. [de-identified] : Go outside a much as you can, stay physical by going out for walks daily [de-identified] : 1/13/2026 [de-identified] : Patient will report weekly that she is going out for walks and staying active to maintain healthy coping skills both physical and mental [FreeTextEntry5] : CBT [de-identified] : Dr Barone/ Dr Goyal [de-identified] : Holly Olea LMSW in person  [de-identified] : Improvement in symptoms as evidenced by: The patient expresses improvement in performing activities of daily living and/or says progress with initial complaint symptoms. In addition, the treatment team will conduct diagnose-based screenings as needed. [de-identified] : Other rationale for transition/discharge: Other rationales for transition/discharge are granted/applied upon the patient DNC, relocate, self-termination, and/or requests for the treatment termination/transfer to another facility. [de-identified] : Patient declined [Tobacco Screening Completed?] : Tobacco Screening Completed: Yes

## 2025-01-16 NOTE — DISCUSSION/SUMMARY
[1. Helpful Person/Contact Number: _____] : 1. Helpful Person/Contact Number: [unfilled] [a. Clinician Name/Contact Information: _____] : Clinician Name/Contact Information: [unfilled] [b. Clinician Name/Contact Information: _____] : Clinician Name/Contact Information: [unfilled] [c. Local ED/Urgent Care Services/Hotlines (Name/Address/Phone):] : Local ED/Urgent Care Services/Hotlines (Name/Address/Phone): [d. Suicide Prevention Lifeline Phone: [phone]-TALK ([phone]) ] : Suicide Prevention Lifeline Phone: [phone] ([phone])  [e. Suicide Prevention “Text the word BXE4 pj 636491”] : e. Suicide Prevention "Text the word QUE2 ea 039466"  [f. Suicide Prevention - LGBTQ Specific Phone: [phone]] : Suicide Prevention - LGBTQ Specific Phone: [phone] [g. Suicide & Crisis Lifeline - send a text or make a call to 988] : Suicide & Crisis Lifeline - send a text or make a call to 988 [de-identified] : "my family" [Plan Review] : Plan Review [Adherent to treatment recommendations] : adherent to treatment recommendations [Insightful] : insightful [Motivated to participate in treatment] : motivated to participate in treatment [Motivated to maintain or improve physical health] : motivated to maintain or improve physical health [In good health] : in good health [Financially stable] : financially stable [Part of a supportive marriage] : part of a supportive marriage [Part of a supportive family] : part of a supportive family [Involved in cultural/spiritual/Christian/community activities] : involved in cultural/spiritual/Christian/community activities [Access to safe outdoor spaces] : access to safe outdoor spaces [FreeTextEntry2] : 1/13/2026 [FreeTextEntry3] : 7/22/2024-plan review post IPP discharge [FreeTextEntry7] : Plan review post IPP discharge [de-identified] : Dr Barone/ Lore [Mental Health] : Mental Health [Physical Health] : Physical Health [Continued - Progress made] : Continued - Progress made: [every ___ months] : every [unfilled] months [every ___ weeks] : every [unfilled] weeks [Improvement in symptoms as evidenced by:] : Improvement in symptoms as evidenced by: [Other rationale for transition/discharge:] : Other rationale for transition/discharge: [None - Reason others did not participate:] : None - Reason others did not participate:  [Yes] : Yes [Psychiatric Provider/Prescriber] : Psychiatric Provider/Prescriber [Therapist] : Therapist [Supervisor (if needed)] : Supervisor [FreeTextEntry1] : Bipolar disorder, anxiety and depression, hypertension [FreeTextEntry4] : "I want to maintain being healthy with the help of medication and providers [de-identified] : Patient will attempt to follow up with her providers and take medication as prescribed. [de-identified] : Maintain annual physicals and blood work, attend all appointments and take medications as prescribed.  [de-identified] : 1/13/2026 [de-identified] : Patient continues to adhere the treatment plan. [de-identified] : Go outside a much as you can, stay physical by going out for walks daily [de-identified] : 1/13/2026 [de-identified] : Patient will report weekly that she is going out for walks and staying active to maintain healthy coping skills both physical and mental [FreeTextEntry5] : CBT [de-identified] : Dr Barone/ Dr Goyal [de-identified] : Holly Olea LMSW in person  [de-identified] : Improvement in symptoms as evidenced by: The patient expresses improvement in performing activities of daily living and/or says progress with initial complaint symptoms. In addition, the treatment team will conduct diagnose-based screenings as needed. [de-identified] : Other rationale for transition/discharge: Other rationales for transition/discharge are granted/applied upon the patient DNC, relocate, self-termination, and/or requests for the treatment termination/transfer to another facility. [de-identified] : Patient declined [Tobacco Screening Completed?] : Tobacco Screening Completed: Yes

## 2025-01-21 NOTE — PLAN
[FreeTextEntry2] : Domain for Problem/Need I: Mental Health.    Problem: Bipolar disorder, anxiety and depression.    goal (In patient's words): "i want my mood stability and i want to go back to how i used to be happy and healthy".    Objective A: Patient will note improved awareness of triggers that result in ineffective thought, mood, behavior changes and report to the therapist and psychiatrist during sessions.    Objective B: Patient will learn to use coping skills to help manage depression and anxiety and will lower the PHQ and ARETHA 7 score by 3 points until the next treatment plan.     Problem/Need II:  Domain for Problem/Need II: Physical Health.  Problem: Bipolar disorder, anxiety and depression, cholesterol.  Objective A: Maintain annual physical and blood work, attend all appointments and take medications as prescribed.  Objective B: Go outside a much as you can, stay physical by going out for walks daily  [Cognitive and/or Behavior Therapy] : Cognitive and/or Behavior Therapy  [Motivational Interviewing] : Motivational Interviewing  [Psychoeducation] : Psychoeducation  [Supportive Therapy] : Supportive Therapy [de-identified] : Session began at 9am and ended at 9:30am      The patient is seen today for individual therapy post IPP hospitalization. Patient is seen in person today. Patient reports she is starting to notice improvement in her mood, she reports to still have anxiety, but her sleep has been good uninterrupted 8-9 hours of good sleep at night. States she joined [employer] and has been going twice a day using the treadmill for long walks 30-45 minutes intervals. Patient states she is trying her best to keep active by cooking daily, cleaning around the house and spending time with her kids and grandkids. Patient appeared calm and stable and denies S/H/I or self-harming behaviors. Patient prays to god daily and goes to [employer] finding closure in praying to god for her health. We continued to practice deep breathing and the importance of keeping busy and using healthy coping skills. Patient receptive. Patient is not considered high risk at this time.   During today's session, I did motivational interviewing, used CBT, reflective lesson and active listening and supportive therapy. The treatment plan will be reviewed and completed with the patient during the next session. Treatment plan completed with the patient. Her goal is to remain stable and healthy, to learn coping skills to reduce anxiety and depression.      [FreeTextEntry1] : Patient agrees to come to weekly sessions.

## 2025-01-21 NOTE — PLAN
[FreeTextEntry2] : Domain for Problem/Need I: Mental Health.    Problem: Bipolar disorder, anxiety and depression.    goal (In patient's words): "i want my mood stability and i want to go back to how i used to be happy and healthy".    Objective A: Patient will note improved awareness of triggers that result in ineffective thought, mood, behavior changes and report to the therapist and psychiatrist during sessions.    Objective B: Patient will learn to use coping skills to help manage depression and anxiety and will lower the PHQ and ARETHA 7 score by 3 points until the next treatment plan.     Problem/Need II:  Domain for Problem/Need II: Physical Health.  Problem: Bipolar disorder, anxiety and depression, cholesterol.  Objective A: Maintain annual physical and blood work, attend all appointments and take medications as prescribed.  Objective B: Go outside a much as you can, stay physical by going out for walks daily  [Cognitive and/or Behavior Therapy] : Cognitive and/or Behavior Therapy  [Motivational Interviewing] : Motivational Interviewing  [Psychoeducation] : Psychoeducation  [Supportive Therapy] : Supportive Therapy [de-identified] : Session began at 9am and ended at 9:30am      The patient is seen today for individual therapy post IPP hospitalization. Patient is seen in person today. Patient reports she is starting to notice improvement in her mood, she reports to still have anxiety, but her sleep has been good uninterrupted 8-9 hours of good sleep at night. States she joined [employer] and has been going twice a day using the treadmill for long walks 30-45 minutes intervals. Patient states she is trying her best to keep active by cooking daily, cleaning around the house and spending time with her kids and grandkids. Patient appeared calm and stable and denies S/H/I or self-harming behaviors. Patient prays to god daily and goes to [employer] finding closure in praying to god for her health. We continued to practice deep breathing and the importance of keeping busy and using healthy coping skills. Patient receptive. Patient is not considered high risk at this time.   During today's session, I did motivational interviewing, used CBT, reflective lesson and active listening and supportive therapy. The treatment plan will be reviewed and completed with the patient during the next session. Treatment plan completed with the patient. Her goal is to remain stable and healthy, to learn coping skills to reduce anxiety and depression.      [FreeTextEntry1] : Patient agrees to come to weekly sessions.
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
## 2025-01-23 NOTE — PHYSICAL EXAM
[Well groomed] : well groomed [Cooperative] : cooperative [Euthymic] : euthymic [Full] : full [Clear] : clear [Linear/Goal Directed] : linear/goal directed [None] : none [None Reported] : none reported [WNL] : within normal limits [Average] : average [de-identified] : fair [de-identified] : fair

## 2025-01-23 NOTE — PLAN
[Yes. details: ___] : Yes, [unfilled] [Medication education provided] : Medication education provided. [Rationale for medication choices, possible risks/precautions, benefits, alternative treatment choices, and consequences of non-treatment discussed] : Rationale for medication choices, possible risks/precautions, benefits, alternative treatment choices, and consequences of non-treatment discussed with patient/family/caregiver  [FreeTextEntry5] : -Continue Olanzapine 20mg PO Daily for mood- can consider decreasing after pt is stable on Lithium -Increase to Lithium 450mg PO QAM and 300mg PO QPM for mood       -1/17/24- 0.50       -F/u Lithium level next visit -Decrease to Klonopin to 0.5mg PO Daily PRN for anxiety  -Discontinue Colace 100mg PO Bedtime for constipation- discontinue, pt no longer constipated -F/u with therapist Holly -F/u in 1-2 weeks

## 2025-01-23 NOTE — SOCIAL HISTORY
[FreeTextEntry1] : Pt is [place], , with 4 children (son-32yo, daughters- 31, 29, 26) domiciled at home with her  and her 30 y/o daughter, with her 32 y/o daughter living upstairs, Moved from [facility] 34 years ago
10.49

## 2025-01-27 NOTE — PLAN
[FreeTextEntry2] : Domain for Problem/Need I: Mental Health.    Problem: Bipolar disorder, anxiety and depression.    goal (In patient's words): "i want my mood stability and i want to go back to how i used to be happy and healthy".    Objective A: Patient will note improved awareness of triggers that result in ineffective thought, mood, behavior changes and report to the therapist and psychiatrist during sessions.    Objective B: Patient will learn to use coping skills to help manage depression and anxiety and will lower the PHQ and ARETHA 7 score by 3 points until the next treatment plan.     Problem/Need II:  Domain for Problem/Need II: Physical Health.  Problem: Bipolar disorder, anxiety and depression, cholesterol.  Objective A: Maintain annual physical and blood work, attend all appointments and take medications as prescribed.  Objective B: Go outside a much as you can, stay physical by going out for walks daily  [Cognitive and/or Behavior Therapy] : Cognitive and/or Behavior Therapy  [Motivational Interviewing] : Motivational Interviewing  [Psychoeducation] : Psychoeducation  [Supportive Therapy] : Supportive Therapy [de-identified] : Session began at 9am and ended at 9:30am        The patient is seen today for individual therapy post IPP hospitalization. Patient is seen in person today. Patient is noticing improvement in her mood but states continued anxiety and always "being on edge" she can't sit still, but at the meantime "doesn't do anything" states she wants to clean and complete chores but due to the anxiety and lack of motivation she ends up not doing much during the day. Discussed with patient to cut the chores instead of planning to do many things to plan for one chore a day. Patient continued to go to the gym twice a day morning and later in the afternoon which she finds its helping her. Continues to cook daily for her family which writer acknowledged and mentioned that patient is doing things. Patient encouraged to keep going, add more to her day, self care, shower, get dressed and put on music. Patient receptive but states it is hard and she is trying her best but is not to where she wants to be yet. She spoke about being jealous of her friends and how they are healthy and doing well but the patient is sick.    During today's session, I did motivational interviewing, used CBT, reflective lesson and active listening and supportive therapy. Her goal is to remain stable and healthy, to learn coping skills to reduce anxiety and depression. Patient denies S/H/I or self harming behaviors.  [FreeTextEntry1] : Patient agrees to come to weekly sessions.

## 2025-01-27 NOTE — PLAN
[FreeTextEntry2] : Domain for Problem/Need I: Mental Health.    Problem: Bipolar disorder, anxiety and depression.    goal (In patient's words): "i want my mood stability and i want to go back to how i used to be happy and healthy".    Objective A: Patient will note improved awareness of triggers that result in ineffective thought, mood, behavior changes and report to the therapist and psychiatrist during sessions.    Objective B: Patient will learn to use coping skills to help manage depression and anxiety and will lower the PHQ and ARETHA 7 score by 3 points until the next treatment plan.     Problem/Need II:  Domain for Problem/Need II: Physical Health.  Problem: Bipolar disorder, anxiety and depression, cholesterol.  Objective A: Maintain annual physical and blood work, attend all appointments and take medications as prescribed.  Objective B: Go outside a much as you can, stay physical by going out for walks daily  [Cognitive and/or Behavior Therapy] : Cognitive and/or Behavior Therapy  [Motivational Interviewing] : Motivational Interviewing  [Psychoeducation] : Psychoeducation  [Supportive Therapy] : Supportive Therapy [de-identified] : Session began at 9am and ended at 9:30am        The patient is seen today for individual therapy post IPP hospitalization. Patient is seen in person today. Patient is noticing improvement in her mood but states continued anxiety and always "being on edge" she can't sit still, but at the meantime "doesn't do anything" states she wants to clean and complete chores but due to the anxiety and lack of motivation she ends up not doing much during the day. Discussed with patient to cut the chores instead of planning to do many things to plan for one chore a day. Patient continued to go to the gym twice a day morning and later in the afternoon which she finds its helping her. Continues to cook daily for her family which writer acknowledged and mentioned that patient is doing things. Patient encouraged to keep going, add more to her day, self care, shower, get dressed and put on music. Patient receptive but states it is hard and she is trying her best but is not to where she wants to be yet. She spoke about being jealous of her friends and how they are healthy and doing well but the patient is sick.    During today's session, I did motivational interviewing, used CBT, reflective lesson and active listening and supportive therapy. Her goal is to remain stable and healthy, to learn coping skills to reduce anxiety and depression. Patient denies S/H/I or self harming behaviors.  [FreeTextEntry1] : Patient agrees to come to weekly sessions.

## 2025-02-04 NOTE — PHYSICAL EXAM
[Cooperative] : cooperative [Anxious] : anxious [Full] : full [Clear] : clear [Racing] : racing [Preoccupations/Ruminations] : preoccupations/ruminations [Average] : average [WNL] : within normal limits [Mild] : mild [5 - Markedly ill] : 5 - Markedly ill  (intrusive symptoms that distinctly impair social/occupational function or cause intrusive levels of distress) [3 - Minimally improved] : 3 - Minimally improved  (slightly better with little or no clinically meaningful reduction of symptoms. Represents very little change in basic clinical status, level of care, or functional capacity) [Individual reports tobacco use during the last 30 days?] : Individual reports tobacco use during the last 30 days? Yes [Individual reports use of the following tobacco products during the last 30 days?] : Individual reports use of the following tobacco products during the last 30 days? Yes -  [Cigarettes] : Cigarettes [Was tobacco cessation medication and/or nicotine replacement therapy recommended?] : Was tobacco cessation medication and/or nicotine replacement therapy recommended? Yes [Individual reports current use of tobacco cessation medication or nicotine replacement therapy?] : Individual reports current use of tobacco cessation medication or nicotine replacement therapy? No [Does individual accept referral to MD for cessation medication or NRT?] : Does individual accept referral to MD for cessation medication or NRT? No

## 2025-02-04 NOTE — PLAN
[Cognitive and/or Behavior Therapy] : Cognitive and/or Behavior Therapy  [Motivational Interviewing] : Motivational Interviewing  [Psychoeducation] : Psychoeducation  [Supportive Therapy] : Supportive Therapy [FreeTextEntry2] : Domain for Problem/Need I: Mental Health.    Problem: Bipolar disorder, anxiety and depression.    goal (In patient's words): "i want my mood stability and i want to go back to how i used to be happy and healthy".    Objective A: Patient will note improved awareness of triggers that result in ineffective thought, mood, behavior changes and report to the therapist and psychiatrist during sessions.    Objective B: Patient will learn to use coping skills to help manage depression and anxiety and will lower the PHQ and ARETHA 7 score by 3 points until the next treatment plan.     Problem/Need II:  Domain for Problem/Need II: Physical Health.  Problem: Bipolar disorder, anxiety and depression, cholesterol.  Objective A: Maintain annual physical and blood work, attend all appointments and take medications as prescribed.  Objective B: Go outside a much as you can, stay physical by going out for walks daily  [de-identified] : Session began at 9am and ended at 9:30am        The patient is seen today for individual therapy post IPP hospitalization. Patient is seen in person today. Patient is noticing improvement in her mood but states continued anxiety and always "being on edge" she can't sit still. Continues to cook daily for her family which writer acknowledged and mentioned that patient is doing things. Patient encouraged to keep going, add more to her day, self care, shower, get dressed and put on music. Patient receptive but states it is hard and she is trying her best but is not to where she wants to be yet. She spoke about being jealous of her friends and how they are healthy and doing well but the patient is sick.  Patient also states she has been going to the gym 3 times a day plus walks to the park which she notices has been helping. Patient is doing everything she can to feel better byt still thinks she isnt doing enough or she isnt well.   During today's session, I did motivational interviewing, used CBT, reflective lesson and active listening and supportive therapy. Her goal is to remain stable and healthy, to learn coping skills to reduce anxiety and depression. Patient denies S/H/I or self harming behaviors.  [FreeTextEntry1] : Patient agrees to come to weekly sessions.

## 2025-02-04 NOTE — PLAN
[Cognitive and/or Behavior Therapy] : Cognitive and/or Behavior Therapy  [Motivational Interviewing] : Motivational Interviewing  [Psychoeducation] : Psychoeducation  [Supportive Therapy] : Supportive Therapy [FreeTextEntry2] : Domain for Problem/Need I: Mental Health.    Problem: Bipolar disorder, anxiety and depression.    goal (In patient's words): "i want my mood stability and i want to go back to how i used to be happy and healthy".    Objective A: Patient will note improved awareness of triggers that result in ineffective thought, mood, behavior changes and report to the therapist and psychiatrist during sessions.    Objective B: Patient will learn to use coping skills to help manage depression and anxiety and will lower the PHQ and ARETHA 7 score by 3 points until the next treatment plan.     Problem/Need II:  Domain for Problem/Need II: Physical Health.  Problem: Bipolar disorder, anxiety and depression, cholesterol.  Objective A: Maintain annual physical and blood work, attend all appointments and take medications as prescribed.  Objective B: Go outside a much as you can, stay physical by going out for walks daily  [de-identified] : Session began at 9am and ended at 9:30am        The patient is seen today for individual therapy post IPP hospitalization. Patient is seen in person today. Patient is noticing improvement in her mood but states continued anxiety and always "being on edge" she can't sit still. Continues to cook daily for her family which writer acknowledged and mentioned that patient is doing things. Patient encouraged to keep going, add more to her day, self care, shower, get dressed and put on music. Patient receptive but states it is hard and she is trying her best but is not to where she wants to be yet. She spoke about being jealous of her friends and how they are healthy and doing well but the patient is sick.  Patient also states she has been going to the gym 3 times a day plus walks to the park which she notices has been helping. Patient is doing everything she can to feel better byt still thinks she isnt doing enough or she isnt well.   During today's session, I did motivational interviewing, used CBT, reflective lesson and active listening and supportive therapy. Her goal is to remain stable and healthy, to learn coping skills to reduce anxiety and depression. Patient denies S/H/I or self harming behaviors.  [FreeTextEntry1] : Patient agrees to come to weekly sessions.
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
## 2025-02-06 NOTE — PHYSICAL EXAM
[Well groomed] : well groomed [Cooperative] : cooperative [Euthymic] : euthymic [Full] : full [Clear] : clear [Linear/Goal Directed] : linear/goal directed [None] : none [None Reported] : none reported [WNL] : within normal limits [Average] : average [de-identified] : fair [de-identified] : fair

## 2025-02-06 NOTE — PLAN
[Yes. details: ___] : Yes, [unfilled] [Medication education provided] : Medication education provided. [Rationale for medication choices, possible risks/precautions, benefits, alternative treatment choices, and consequences of non-treatment discussed] : Rationale for medication choices, possible risks/precautions, benefits, alternative treatment choices, and consequences of non-treatment discussed with patient/family/caregiver  [FreeTextEntry5] : -Decrease to Olanzapine 15mg PO Daily for mood -Increase to Lithium 450mg POBID for mood       -1/29/24 0.58       -F/u Lithium level next visit -Continue Klonopin to 0.5mg PO Daily PRN for anxiety  -Start NRT- Nicotine patch 21mg PO Daily and Nicotine Gum PRN for nicotine withdrawal -F/u with therapist Holly -F/u in 1-2 weeks
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
## 2025-02-06 NOTE — SOCIAL HISTORY
[FreeTextEntry1] : Pt is [place], , with 4 children (son-32yo, daughters- 31, 29, 26) domiciled at home with her  and her 28 y/o daughter, with her 32 y/o daughter living upstairs, Moved from [facility] 34 years ago

## 2025-02-13 NOTE — PHYSICAL EXAM
[Well groomed] : well groomed [Cooperative] : cooperative [Euthymic] : euthymic [Full] : full [Clear] : clear [Linear/Goal Directed] : linear/goal directed [None] : none [None Reported] : none reported [WNL] : within normal limits [Average] : average [de-identified] : fair [de-identified] : fair
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
## 2025-02-13 NOTE — PLAN
[Yes. details: ___] : Yes, [unfilled] [Medication education provided] : Medication education provided. [Rationale for medication choices, possible risks/precautions, benefits, alternative treatment choices, and consequences of non-treatment discussed] : Rationale for medication choices, possible risks/precautions, benefits, alternative treatment choices, and consequences of non-treatment discussed with patient/family/caregiver  [FreeTextEntry5] : -Decrease to Olanzapine 10mg PO Daily for mood -Increase to Lithium 450mg PO QAM and Lithium 600mg QPM for mood       -2/10/2025:  0.75       -F/u Lithium level next visit -Continue Klonopin to 0.5mg PO Daily PRN for anxiety  -Start NRT- Nicotine patch 21mg PO Daily and Nicotine Gum PRN for nicotine withdrawal -F/u with therapist Holly -F/u in 2 weeks
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
## 2025-02-27 NOTE — SOCIAL HISTORY
[FreeTextEntry1] : Pt is Tel[place], , with 4 children (son-34yo, daughters- 31, 29, 26) domiciled at home with her  and her 30 y/o daughter, with her 32 y/o daughter living upstairs, Moved from [facility] 34 years ago

## 2025-02-27 NOTE — PLAN
[Medication education provided] : Medication education provided. [Rationale for medication choices, possible risks/precautions, benefits, alternative treatment choices, and consequences of non-treatment discussed] : Rationale for medication choices, possible risks/precautions, benefits, alternative treatment choices, and consequences of non-treatment discussed with patient/family/caregiver  [FreeTextEntry5] : -Decrease to Olanzapine 5mg PO Daily for mood -Continue Lithium 450mg PO QAM and Lithium 600mg QPM for mood       -2/10/2025:  0.75       -F/u Lithium level next visit -Continue Klonopin to 0.5mg PO Daily PRN for anxiety  -Continue NRT- Nicotine patch 21mg PO Daily and Nicotine Gum PRN for nicotine withdrawal -F/u with therapist Holly -F/u in 2-4 weeks

## 2025-02-27 NOTE — PHYSICAL EXAM
[Well groomed] : well groomed [Cooperative] : cooperative [Euthymic] : euthymic [Full] : full [Clear] : clear [Linear/Goal Directed] : linear/goal directed [None] : none [None Reported] : none reported [WNL] : within normal limits [Average] : average [de-identified] : fair [de-identified] : fair

## 2025-03-04 NOTE — PLAN
[FreeTextEntry2] : Domain for Problem/Need I: Mental Health.    Problem: Bipolar disorder, anxiety and depression.    goal (In patient's words): "i want my mood stability and i want to go back to how i used to be happy and healthy".    Objective A: Patient will note improved awareness of triggers that result in ineffective thought, mood, behavior changes and report to the therapist and psychiatrist during sessions.    Objective B: Patient will learn to use coping skills to help manage depression and anxiety and will lower the PHQ and ARETHA 7 score by 3 points until the next treatment plan.     Problem/Need II:  Domain for Problem/Need II: Physical Health.  Problem: Bipolar disorder, anxiety and depression, cholesterol.  Objective A: Maintain annual physical and blood work, attend all appointments and take medications as prescribed.  Objective B: Go outside a much as you can, stay physical by going out for walks daily  [Cognitive and/or Behavior Therapy] : Cognitive and/or Behavior Therapy  [Motivational Interviewing] : Motivational Interviewing  [Psychoeducation] : Psychoeducation  [Supportive Therapy] : Supportive Therapy [de-identified] : Session began at 9am and ended at 9:30am . The patient is seen today for individual therapy post IPP hospitalization. Patient is seen in person today. Patient is doing better, since IPP hospitalization, she is taking the meds as prescribed, has noticed improvement, is really active goes to the gym 3 times a day, spends time with her family and friends. Her jose concerns is the continued jealousy of her friends comparing herself to others and the swelling of her eye lids has been really bothering her. She has a follow up appointment with an eye specialist today.  Denies any thoughts of self-harm. During today's session, I did motivational interviewing, used CBT, reflective lesson and active listening and supportive therapy. Her goal is to remain stable and healthy, to learn coping skills to reduce anxiety and depression. Patient denies S/H/I or self harming behaviors.  [FreeTextEntry1] : Patient agrees to come to weekly sessions.

## 2025-03-04 NOTE — PHYSICAL EXAM
[Cooperative] : cooperative [Anxious] : anxious [Full] : full [Clear] : clear [Racing] : racing [Preoccupations/Ruminations] : preoccupations/ruminations [Average] : average [WNL] : within normal limits [Mild] : mild [5 - Markedly ill] : 5 - Markedly ill  (intrusive symptoms that distinctly impair social/occupational function or cause intrusive levels of distress) [3 - Minimally improved] : 3 - Minimally improved  (slightly better with little or no clinically meaningful reduction of symptoms. Represents very little change in basic clinical status, level of care, or functional capacity) [Individual reports tobacco use during the last 30 days?] : Individual reports tobacco use during the last 30 days? Yes [Individual reports use of the following tobacco products during the last 30 days?] : Individual reports use of the following tobacco products during the last 30 days? Yes -  [Cigarettes] : Cigarettes [Individual reports current use of tobacco cessation medication or nicotine replacement therapy?] : Individual reports current use of tobacco cessation medication or nicotine replacement therapy? No [Was tobacco cessation medication and/or nicotine replacement therapy recommended?] : Was tobacco cessation medication and/or nicotine replacement therapy recommended? Yes [Does individual accept referral to MD for cessation medication or NRT?] : Does individual accept referral to MD for cessation medication or NRT? No
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
## 2025-03-17 NOTE — SOCIAL HISTORY
[FreeTextEntry1] : Pt is Japanese, , with 4 children (son-34yo, daughters- 31, 29, 26) domiciled at home with her  and her 28 y/o daughter, with her 32 y/o daughter living upstairs, Moved from [facility] 34 years ago
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
## 2025-03-17 NOTE — PLAN
[Medication education provided] : Medication education provided. [Rationale for medication choices, possible risks/precautions, benefits, alternative treatment choices, and consequences of non-treatment discussed] : Rationale for medication choices, possible risks/precautions, benefits, alternative treatment choices, and consequences of non-treatment discussed with patient/family/caregiver  [FreeTextEntry5] : -Discontinue Olanzapine 5mg PO Daily for mood -Continue Lithium 450mg PO QAM and Lithium 600mg QPM for mood       -3/7/2025:  0.92       -F/u Lithium level next visit -Continue Klonopin to 0.5mg PO Daily PRN for anxiety (7 pills sent), will attempt to discontinue, if possible -Continue NRT- Nicotine patch 21mg PO Daily and Nicotine Gum PRN for nicotine withdrawal -F/u with therapist Holly -F/u in 4 weeks

## 2025-03-24 NOTE — DISCUSSION/SUMMARY
[FreeTextEntry1] : Thyroid blood work was dropped off by patient's daughter Abnormal levels resulted on 2/11 include: TSH 9.54, Free T4 0.7, Thyroid peroxidase antibody >600 Discussed importance of following up with endocrinology, pt has an appt on 4/11

## 2025-03-25 NOTE — PLAN
[FreeTextEntry2] : Domain for Problem/Need I: Mental Health.    Problem: Bipolar disorder, anxiety and depression.    goal (In patient's words): "i want my mood stability and i want to go back to how i used to be happy and healthy".    Objective A: Patient will note improved awareness of triggers that result in ineffective thought, mood, behavior changes and report to the therapist and psychiatrist during sessions.    Objective B: Patient will learn to use coping skills to help manage depression and anxiety and will lower the PHQ and ARETHA 7 score by 3 points until the next treatment plan.     Problem/Need II:  Domain for Problem/Need II: Physical Health.  Problem: Bipolar disorder, anxiety and depression, cholesterol.  Objective A: Maintain annual physical and blood work, attend all appointments and take medications as prescribed.  Objective B: Go outside a much as you can, stay physical by going out for walks daily  [Cognitive and/or Behavior Therapy] : Cognitive and/or Behavior Therapy  [Motivational Interviewing] : Motivational Interviewing  [Psychoeducation] : Psychoeducation  [Supportive Therapy] : Supportive Therapy [de-identified] : Session began at 9am and ended at 9:30am . The patient is seen today for individual therapy. Patient is seen in person today. Patient states that she did blood work and it has been found that she has thyroid issues she isn't sure if its high or low but will schedule an appointment with the specialist. Patient was told that could be the reason that her eyes are puffed and with thyroid tx the puffiness should go down. She has an endocrinology appointment on April 11.  Patient is feeling good, she is enjoying herself goes for long walks, shopping with friends, spends time with the family and is very happy she is not depressed.  She has also been going to [employer] daily in this month of Ramadan. Denies any thoughts of self-harm. During today's session, I did motivational interviewing, used CBT, reflective lesson and active listening and supportive therapy. Her goal is to remain stable and healthy, to learn coping skills to reduce anxiety and depression. Patient denies S/H/I or self harming behaviors.    [FreeTextEntry1] : Patient agrees to come to weekly sessions.

## 2025-03-25 NOTE — PLAN
[FreeTextEntry2] : Domain for Problem/Need I: Mental Health.    Problem: Bipolar disorder, anxiety and depression.    goal (In patient's words): "i want my mood stability and i want to go back to how i used to be happy and healthy".    Objective A: Patient will note improved awareness of triggers that result in ineffective thought, mood, behavior changes and report to the therapist and psychiatrist during sessions.    Objective B: Patient will learn to use coping skills to help manage depression and anxiety and will lower the PHQ and ARETHA 7 score by 3 points until the next treatment plan.     Problem/Need II:  Domain for Problem/Need II: Physical Health.  Problem: Bipolar disorder, anxiety and depression, cholesterol.  Objective A: Maintain annual physical and blood work, attend all appointments and take medications as prescribed.  Objective B: Go outside a much as you can, stay physical by going out for walks daily  [Cognitive and/or Behavior Therapy] : Cognitive and/or Behavior Therapy  [Motivational Interviewing] : Motivational Interviewing  [Psychoeducation] : Psychoeducation  [Supportive Therapy] : Supportive Therapy [de-identified] : Session began at 9am and ended at 9:30am . The patient is seen today for individual therapy. Patient is seen in person today. Patient states that she did blood work and it has been found that she has thyroid issues she isn't sure if its high or low but will schedule an appointment with the specialist. Patient was told that could be the reason that her eyes are puffed and with thyroid tx the puffiness should go down. She has an endocrinology appointment on April 11.  Patient is feeling good, she is enjoying herself goes for long walks, shopping with friends, spends time with the family and is very happy she is not depressed.  She has also been going to [employer] daily in this month of Ramadan. Denies any thoughts of self-harm. During today's session, I did motivational interviewing, used CBT, reflective lesson and active listening and supportive therapy. Her goal is to remain stable and healthy, to learn coping skills to reduce anxiety and depression. Patient denies S/H/I or self harming behaviors.    [FreeTextEntry1] : Patient agrees to come to weekly sessions.

## 2025-04-14 NOTE — PLAN
[Medication education provided] : Medication education provided. [Rationale for medication choices, possible risks/precautions, benefits, alternative treatment choices, and consequences of non-treatment discussed] : Rationale for medication choices, possible risks/precautions, benefits, alternative treatment choices, and consequences of non-treatment discussed with patient/family/caregiver  [FreeTextEntry5] : -Restart Olanzapine 5mg PO Daily for mood -Continue Lithium 450mg PO QAM and Lithium 600mg QPM for mood       -3/7/2025:  0.92       -F/u Lithium level next visit -Continue NRT- Nicotine patch 21mg PO Daily and Nicotine Gum PRN for nicotine withdrawal -F/u with therapist Holly -F/u in 4 weeks

## 2025-04-14 NOTE — PHYSICAL EXAM
[Well groomed] : well groomed [Cooperative] : cooperative [Euthymic] : euthymic [Clear] : clear [Linear/Goal Directed] : linear/goal directed [None] : none [None Reported] : none reported [WNL] : within normal limits [Average] : average [Constricted] : constricted [FreeTextEntry1] : tired  [de-identified] : fair [de-identified] : fair
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
## 2025-04-14 NOTE — SOCIAL HISTORY
[FreeTextEntry1] : Pt is French, , with 4 children (son-34yo, daughters- 31, 29, 26) domiciled at home with her  and her 28 y/o daughter, with her 30 y/o daughter living upstairs, Moved from [facility] 34 years ago
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
## 2025-04-15 NOTE — PLAN
[FreeTextEntry2] : Domain for Problem/Need I: Mental Health.    Problem: Bipolar disorder, anxiety and depression.    goal (In patient's words): "i want my mood stability and i want to go back to how i used to be happy and healthy".    Objective A: Patient will note improved awareness of triggers that result in ineffective thought, mood, behavior changes and report to the therapist and psychiatrist during sessions.    Objective B: Patient will learn to use coping skills to help manage depression and anxiety and will lower the PHQ and ARETHA 7 score by 3 points until the next treatment plan.     Problem/Need II:  Domain for Problem/Need II: Physical Health.  Problem: Bipolar disorder, anxiety and depression, cholesterol.  Objective A: Maintain annual physical and blood work, attend all appointments and take medications as prescribed.  Objective B: Go outside a much as you can, stay physical by going out for walks daily  [Cognitive and/or Behavior Therapy] : Cognitive and/or Behavior Therapy  [Motivational Interviewing] : Motivational Interviewing  [Psychoeducation] : Psychoeducation  [Supportive Therapy] : Supportive Therapy [de-identified] : Session began at 11:30am and ended at 12pm . The patient is seen today for individual therapy. Patient is seen in person today. About a week or so ago patient stopped sleeping, getting 3-4 hours a night or even 1. patient was seen by Dr Goyal yesterday and was put on sleeping medication. Last night she reported she slept uninterrupted from 10:30 pm until this morning 7am. Today in person she states she feel tired but is happy she got to sleep all night. Patients daughter lives upstairs in her house and her in-laws are visiting for a month. Patient shifted her irritation to them wants them to leave and go back to their country and feels that they are invading her daughter privacy.  She continues t go to the gym 2-3 times a day for walks, continues to spend time with her family and friends. Takes her meds.  During today's session, I did motivational interviewing, used CBT, reflective lesson and active listening and supportive therapy. Her goal is to remain stable and healthy, to learn coping skills to reduce anxiety and depression. Patient denies S/H/I or self harming behaviors.    [FreeTextEntry1] : Patient agrees to come to weekly sessions.

## 2025-04-15 NOTE — PLAN
[FreeTextEntry2] : Domain for Problem/Need I: Mental Health.    Problem: Bipolar disorder, anxiety and depression.    goal (In patient's words): "i want my mood stability and i want to go back to how i used to be happy and healthy".    Objective A: Patient will note improved awareness of triggers that result in ineffective thought, mood, behavior changes and report to the therapist and psychiatrist during sessions.    Objective B: Patient will learn to use coping skills to help manage depression and anxiety and will lower the PHQ and ARETHA 7 score by 3 points until the next treatment plan.     Problem/Need II:  Domain for Problem/Need II: Physical Health.  Problem: Bipolar disorder, anxiety and depression, cholesterol.  Objective A: Maintain annual physical and blood work, attend all appointments and take medications as prescribed.  Objective B: Go outside a much as you can, stay physical by going out for walks daily  [Cognitive and/or Behavior Therapy] : Cognitive and/or Behavior Therapy  [Motivational Interviewing] : Motivational Interviewing  [Psychoeducation] : Psychoeducation  [Supportive Therapy] : Supportive Therapy [de-identified] : Session began at 11:30am and ended at 12pm . The patient is seen today for individual therapy. Patient is seen in person today. About a week or so ago patient stopped sleeping, getting 3-4 hours a night or even 1. patient was seen by Dr Goyal yesterday and was put on sleeping medication. Last night she reported she slept uninterrupted from 10:30 pm until this morning 7am. Today in person she states she feel tired but is happy she got to sleep all night. Patients daughter lives upstairs in her house and her in-laws are visiting for a month. Patient shifted her irritation to them wants them to leave and go back to their country and feels that they are invading her daughter privacy.  She continues t go to the gym 2-3 times a day for walks, continues to spend time with her family and friends. Takes her meds.  During today's session, I did motivational interviewing, used CBT, reflective lesson and active listening and supportive therapy. Her goal is to remain stable and healthy, to learn coping skills to reduce anxiety and depression. Patient denies S/H/I or self harming behaviors.    [FreeTextEntry1] : Patient agrees to come to weekly sessions.

## 2025-04-28 NOTE — PLAN
[Medication education provided] : Medication education provided. [Rationale for medication choices, possible risks/precautions, benefits, alternative treatment choices, and consequences of non-treatment discussed] : Rationale for medication choices, possible risks/precautions, benefits, alternative treatment choices, and consequences of non-treatment discussed with patient/family/caregiver  [FreeTextEntry5] : -Discontinue Olanzapine 5mg PO Daily for mood -Start Seroquel 50mg PO Bedtime (continue titrating up to appropriate dose for patient) -Continue Lithium 450mg PO QAM and Lithium 600mg QPM for mood       -3/7/2025:  0.92       -F/u Lithium level next visit -Continue NRT- Nicotine patch 21mg PO Daily and Nicotine Gum PRN for nicotine withdrawal -F/u with therapist Holly -F/u in 1-2 weeks
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
## 2025-04-28 NOTE — SOCIAL HISTORY
[FreeTextEntry1] : Pt is Romanian, , with 4 children (son-34yo, daughters- 31, 29, 26) domiciled at home with her  and her 30 y/o daughter, with her 32 y/o daughter living upstairs, Moved from [facility] 34 years ago

## 2025-04-28 NOTE — PHYSICAL EXAM
[Well groomed] : well groomed [Cooperative] : cooperative [Euthymic] : euthymic [Full] : full [Clear] : clear [Linear/Goal Directed] : linear/goal directed [None] : none [None Reported] : none reported [WNL] : within normal limits [Average] : average [de-identified] : fair [de-identified] : fair

## 2025-04-29 NOTE — PLAN
[FreeTextEntry2] : Domain for Problem/Need I: Mental Health.    Problem: Bipolar disorder, anxiety and depression.    goal (In patient's words): "i want my mood stability and i want to go back to how i used to be happy and healthy".    Objective A: Patient will note improved awareness of triggers that result in ineffective thought, mood, behavior changes and report to the therapist and psychiatrist during sessions.    Objective B: Patient will learn to use coping skills to help manage depression and anxiety and will lower the PHQ and ARETHA 7 score by 3 points until the next treatment plan.     Problem/Need II:  Domain for Problem/Need II: Physical Health.  Problem: Bipolar disorder, anxiety and depression, cholesterol.  Objective A: Maintain annual physical and blood work, attend all appointments and take medications as prescribed.  Objective B: Go outside a much as you can, stay physical by going out for walks daily  [Cognitive and/or Behavior Therapy] : Cognitive and/or Behavior Therapy  [Motivational Interviewing] : Motivational Interviewing  [Psychoeducation] : Psychoeducation  [Supportive Therapy] : Supportive Therapy [de-identified] : Session began at 9am and ended at 9:30am. She reported significant progress, mentioning adherence to her medication regimen and improved sleep patterns. The patient has been active, engaging in frequent walks in the park and workouts at the gym, both three times daily. Socially, she has been spending quality time with friends, family, and her children, which appears to be having a positive impact on her overall well-being.  However, she did express ongoing issues with anxiety, particularly when sitting still, which leads to fear and negative thoughts about her health. This results in her feeling the need to move around to alleviate her anxiety. Despite this, she is enthusiastic about the upcoming summer and the prospect of visiting the beach daily.  The patient reported no changes or concerns regarding her thyroid medication, although she noted feeling energetic. She denied any negative thoughts, self-harming behaviors, or suicidal/homicidal ideations during today's session.  Therapeutic techniques employed today included cognitive-behavioral therapy (CBT), supportive therapy, and reflective listening. Overall, she is managing well and looking forward to continuing her activities and plans for the summer. [FreeTextEntry1] : Patient agrees to come to weekly-biweekly sessions.
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
## 2025-05-08 NOTE — PLAN
[Medication education provided] : Medication education provided. [Rationale for medication choices, possible risks/precautions, benefits, alternative treatment choices, and consequences of non-treatment discussed] : Rationale for medication choices, possible risks/precautions, benefits, alternative treatment choices, and consequences of non-treatment discussed with patient/family/caregiver  [FreeTextEntry5] : -Increase to Seroquel 100mg PO Bedtime (continue titrating up to appropriate dose for patient) -Continue Lithium 450mg PO QAM and Lithium 600mg QPM for mood       -3/7/2025:  0.92       -F/u Lithium level next visit -Continue NRT- Nicotine patch 21mg PO Daily and Nicotine Gum PRN for nicotine withdrawal -F/u with therapist Holly -F/u in 2 weeks

## 2025-05-08 NOTE — SOCIAL HISTORY
[FreeTextEntry1] : Pt is Kazakh, , with 4 children (son-32yo, daughters- 31, 29, 26) domiciled at home with her  and her 30 y/o daughter, with her 30 y/o daughter living upstairs, Moved from [facility] 34 years ago
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
## 2025-05-08 NOTE — PHYSICAL EXAM
[Well groomed] : well groomed [Cooperative] : cooperative [Euthymic] : euthymic [Full] : full [Clear] : clear [Linear/Goal Directed] : linear/goal directed [None] : none [None Reported] : none reported [WNL] : within normal limits [Average] : average [de-identified] : fair [de-identified] : fair

## 2025-05-14 NOTE — PLAN
[FreeTextEntry2] : Domain for Problem/Need I: Mental Health.    Problem: Bipolar disorder, anxiety and depression.    goal (In patient's words): "i want my mood stability and i want to go back to how i used to be happy and healthy".    Objective A: Patient will note improved awareness of triggers that result in ineffective thought, mood, behavior changes and report to the therapist and psychiatrist during sessions.    Objective B: Patient will learn to use coping skills to help manage depression and anxiety and will lower the PHQ and ARETHA 7 score by 3 points until the next treatment plan.     Problem/Need II:  Domain for Problem/Need II: Physical Health.  Problem: Bipolar disorder, anxiety and depression, cholesterol.  Objective A: Maintain annual physical and blood work, attend all appointments and take medications as prescribed.  Objective B: Go outside a much as you can, stay physical by going out for walks daily  [Cognitive and/or Behavior Therapy] : Cognitive and/or Behavior Therapy  [Motivational Interviewing] : Motivational Interviewing  [Psychoeducation] : Psychoeducation  [Supportive Therapy] : Supportive Therapy [de-identified] : Session began at 9am and ended at 9:30am. Patient reports that she is generally doing well but has experienced physical discomfort this week, including neck and head pain, described as stiffness. She has scheduled a consultation with her primary care provider. Emotionally, she states that she is trying her best to push through, but continues to experience periods of low mood, depression, and anxiety. She reports increased pacing during anxious moments. Additionally, the patient expressed concern about recent feelings of jealousy toward friends and family, noting that this is not typical for her and is confusing, as she previously identified as caring and generous. She is unsure of the origin of these feelings and is seeking ways to manage them. Insight is present, and patient is open to discussing emotional and behavioral changes. Physical discomfort may be contributing to her psychological distress. Supportive reframing was used to help patient recognize and affirm the positive aspects of her life, including familial and financial stability.  The patient reported no changes or concerns regarding her thyroid medication, although she noted feeling energetic. She denied any negative thoughts, self-harming behaviors, or suicidal/homicidal ideations during today's session.  Therapeutic techniques employed today included cognitive-behavioral therapy (CBT), supportive therapy, and reflective listening. Overall, she is managing well and looking forward to continuing her activities and plans for the summer. [FreeTextEntry1] : Patient agrees to come to weekly-biweekly sessions.

## 2025-05-14 NOTE — PLAN
[FreeTextEntry2] : Domain for Problem/Need I: Mental Health.    Problem: Bipolar disorder, anxiety and depression.    goal (In patient's words): "i want my mood stability and i want to go back to how i used to be happy and healthy".    Objective A: Patient will note improved awareness of triggers that result in ineffective thought, mood, behavior changes and report to the therapist and psychiatrist during sessions.    Objective B: Patient will learn to use coping skills to help manage depression and anxiety and will lower the PHQ and ARETHA 7 score by 3 points until the next treatment plan.     Problem/Need II:  Domain for Problem/Need II: Physical Health.  Problem: Bipolar disorder, anxiety and depression, cholesterol.  Objective A: Maintain annual physical and blood work, attend all appointments and take medications as prescribed.  Objective B: Go outside a much as you can, stay physical by going out for walks daily  [Cognitive and/or Behavior Therapy] : Cognitive and/or Behavior Therapy  [Motivational Interviewing] : Motivational Interviewing  [Psychoeducation] : Psychoeducation  [Supportive Therapy] : Supportive Therapy [de-identified] : Session began at 9am and ended at 9:30am. Patient reports that she is generally doing well but has experienced physical discomfort this week, including neck and head pain, described as stiffness. She has scheduled a consultation with her primary care provider. Emotionally, she states that she is trying her best to push through, but continues to experience periods of low mood, depression, and anxiety. She reports increased pacing during anxious moments. Additionally, the patient expressed concern about recent feelings of jealousy toward friends and family, noting that this is not typical for her and is confusing, as she previously identified as caring and generous. She is unsure of the origin of these feelings and is seeking ways to manage them. Insight is present, and patient is open to discussing emotional and behavioral changes. Physical discomfort may be contributing to her psychological distress. Supportive reframing was used to help patient recognize and affirm the positive aspects of her life, including familial and financial stability.  The patient reported no changes or concerns regarding her thyroid medication, although she noted feeling energetic. She denied any negative thoughts, self-harming behaviors, or suicidal/homicidal ideations during today's session.  Therapeutic techniques employed today included cognitive-behavioral therapy (CBT), supportive therapy, and reflective listening. Overall, she is managing well and looking forward to continuing her activities and plans for the summer. [FreeTextEntry1] : Patient agrees to come to weekly-biweekly sessions.
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
## 2025-05-22 NOTE — SOCIAL HISTORY
[FreeTextEntry1] : Pt is Kiswahili, , with 4 children (son-32yo, daughters- 31, 29, 26) domiciled at home with her  and her 28 y/o daughter, with her 32 y/o daughter living upstairs, Moved from [facility] 34 years ago
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
## 2025-05-22 NOTE — PHYSICAL EXAM
[Well groomed] : well groomed [Cooperative] : cooperative [Euthymic] : euthymic [Full] : full [Clear] : clear [Linear/Goal Directed] : linear/goal directed [None] : none [None Reported] : none reported [WNL] : within normal limits [Average] : average [de-identified] : fair [de-identified] : fair

## 2025-05-22 NOTE — PLAN
[Medication education provided] : Medication education provided. [Rationale for medication choices, possible risks/precautions, benefits, alternative treatment choices, and consequences of non-treatment discussed] : Rationale for medication choices, possible risks/precautions, benefits, alternative treatment choices, and consequences of non-treatment discussed with patient/family/caregiver  [FreeTextEntry5] : -Increase to Seroquel 150mg PO Bedtime (continue titrating up to appropriate dose for patient) -Continue Lithium 450mg PO QAM and Lithium 600mg QPM for mood       -3/7/2025:  0.92       -F/u Lithium level next visit -Continue NRT- Nicotine patch 21mg PO Daily and Nicotine Gum PRN for nicotine withdrawal -F/u with therapist Holly -F/u in 2 weeks

## 2025-06-03 NOTE — PLAN
[Cognitive and/or Behavior Therapy] : Cognitive and/or Behavior Therapy  [Motivational Interviewing] : Motivational Interviewing  [Psychoeducation] : Psychoeducation  [Supportive Therapy] : Supportive Therapy [FreeTextEntry2] : Domain for Problem/Need I: Mental Health.    Problem: Bipolar disorder, anxiety and depression.    goal (In patient's words): "i want my mood stability and i want to go back to how i used to be happy and healthy".    Objective A: Patient will note improved awareness of triggers that result in ineffective thought, mood, behavior changes and report to the therapist and psychiatrist during sessions.    Objective B: Patient will learn to use coping skills to help manage depression and anxiety and will lower the PHQ and ARETHA 7 score by 3 points until the next treatment plan.     Problem/Need II:  Domain for Problem/Need II: Physical Health.  Problem: Bipolar disorder, anxiety and depression, cholesterol.  Objective A: Maintain annual physical and blood work, attend all appointments and take medications as prescribed.  Objective B: Go outside a much as you can, stay physical by going out for walks daily  [de-identified] : Session began at 9am and ended at 9:30am. The patient is managing her mental health commendably and remains proactive in maintaining her motivation and strength. She reported engaging in various activities to support her well-being, including walking 2-3 times a day, spending quality time with family and friends, cooking for her family, shopping, and attending [employer]rianism every Friday with a group of friends. Despite these efforts, she expressed frustration and confusion regarding her mental state, questioning why she does not feel significant improvement even with her active efforts.  During our sessions, the patient is consistently commended for her motivation and commitment to staying active and involved in her community, as these activities are crucial for maintaining stability. She is adhering to her medication regimen as prescribed.  Additionally, she mentioned experiencing a recent increase in her blood pressure, which led her doctors to adjust her medication. She also reported feelings of back and neck pain, which she attributes to the increased tension from her elevated blood pressure.  The patient's dedication to her self-care regimen and her engagement in community and family activities are positive steps in her ongoing management of mental health. It is important to continue these activities, and any adjustments in her medical treatment for her physical symptoms should be monitored closely to ensure overall well-being.  The patient reported no changes or concerns regarding her thyroid medication, although she noted feeling energetic. She denied any negative thoughts, self-harming behaviors, or suicidal/homicidal ideations during today's session.  Therapeutic techniques employed today included cognitive-behavioral therapy (CBT), supportive therapy, and reflective listening. Overall, she is managing well and looking forward to continuing her activities and plans for the summer. [FreeTextEntry1] : Patient agrees to come to weekly-biweekly sessions.

## 2025-06-03 NOTE — PHYSICAL EXAM
[Cooperative] : cooperative [Anxious] : anxious [Full] : full [Clear] : clear [Racing] : racing [Preoccupations/Ruminations] : preoccupations/ruminations [Average] : average [WNL] : within normal limits [Mild] : mild [5 - Markedly ill] : 5 - Markedly ill  (intrusive symptoms that distinctly impair social/occupational function or cause intrusive levels of distress) [4 - No change] : 4 - No change  (symptoms remain essentially unchanged) [Individual reports tobacco use during the last 30 days?] : Individual reports tobacco use during the last 30 days? Yes [Individual reports use of the following tobacco products during the last 30 days?] : Individual reports use of the following tobacco products during the last 30 days? Yes -  [Cigarettes] : Cigarettes [Was tobacco cessation medication and/or nicotine replacement therapy recommended?] : Was tobacco cessation medication and/or nicotine replacement therapy recommended? Yes [Individual reports current use of tobacco cessation medication or nicotine replacement therapy?] : Individual reports current use of tobacco cessation medication or nicotine replacement therapy? No [Does individual accept referral to MD for cessation medication or NRT?] : Does individual accept referral to MD for cessation medication or NRT? No

## 2025-06-10 NOTE — PLAN
[Cognitive and/or Behavior Therapy] : Cognitive and/or Behavior Therapy  [Motivational Interviewing] : Motivational Interviewing  [Psychoeducation] : Psychoeducation  [Supportive Therapy] : Supportive Therapy [FreeTextEntry2] : Domain for Problem/Need I: Mental Health.    Problem: Bipolar disorder, anxiety and depression.    goal (In patient's words): "i want my mood stability and i want to go back to how i used to be happy and healthy".    Objective A: Patient will note improved awareness of triggers that result in ineffective thought, mood, behavior changes and report to the therapist and psychiatrist during sessions.    Objective B: Patient will learn to use coping skills to help manage depression and anxiety and will lower the PHQ and ARETHA 7 score by 3 points until the next treatment plan.     Problem/Need II:  Domain for Problem/Need II: Physical Health.  Problem: Bipolar disorder, anxiety and depression, cholesterol.  Objective A: Maintain annual physical and blood work, attend all appointments and take medications as prescribed.  Objective B: Go outside a much as you can, stay physical by going out for walks daily  [de-identified] : Session began at 9am and ended at 9:30am. She reported engaging in various activities to support her well-being, including walking 2-3 times a day, spending quality time with family and friends, cooking for her family, shopping, and attending [employer] every Friday with a group of friends.During our sessions, the patient is consistently commended for her motivation and commitment to staying active and involved in her community, as these activities are crucial for maintaining stability. She is adhering to her medication regimen as prescribed.   The patient reported no changes or concerns regarding her thyroid medication, although she noted feeling energetic. She denied any negative thoughts, self-harming behaviors, or suicidal/homicidal ideations during today's session.  Therapeutic techniques employed today included cognitive-behavioral therapy (CBT), supportive therapy, and reflective listening. Overall, she is managing well and looking forward to continuing her activities and plans for the summer. [FreeTextEntry1] : Patient agrees to come to weekly-biweekly sessions.

## 2025-06-10 NOTE — PLAN
[Cognitive and/or Behavior Therapy] : Cognitive and/or Behavior Therapy  [Motivational Interviewing] : Motivational Interviewing  [Supportive Therapy] : Supportive Therapy [Psychoeducation] : Psychoeducation  [FreeTextEntry2] : Domain for Problem/Need I: Mental Health.    Problem: Bipolar disorder, anxiety and depression.    goal (In patient's words): "i want my mood stability and i want to go back to how i used to be happy and healthy".    Objective A: Patient will note improved awareness of triggers that result in ineffective thought, mood, behavior changes and report to the therapist and psychiatrist during sessions.    Objective B: Patient will learn to use coping skills to help manage depression and anxiety and will lower the PHQ and ARETHA 7 score by 3 points until the next treatment plan.     Problem/Need II:  Domain for Problem/Need II: Physical Health.  Problem: Bipolar disorder, anxiety and depression, cholesterol.  Objective A: Maintain annual physical and blood work, attend all appointments and take medications as prescribed.  Objective B: Go outside a much as you can, stay physical by going out for walks daily  [de-identified] : Session began at 9am and ended at 9:30am. She reported engaging in various activities to support her well-being, including walking 2-3 times a day, spending quality time with family and friends, cooking for her family, shopping, and attending [employer] every Friday with a group of friends.During our sessions, the patient is consistently commended for her motivation and commitment to staying active and involved in her community, as these activities are crucial for maintaining stability. She is adhering to her medication regimen as prescribed.   The patient reported no changes or concerns regarding her thyroid medication, although she noted feeling energetic. She denied any negative thoughts, self-harming behaviors, or suicidal/homicidal ideations during today's session.  Therapeutic techniques employed today included cognitive-behavioral therapy (CBT), supportive therapy, and reflective listening. Overall, she is managing well and looking forward to continuing her activities and plans for the summer. [FreeTextEntry1] : Patient agrees to come to weekly-biweekly sessions.

## 2025-06-24 NOTE — PLAN
[FreeTextEntry2] : Domain for Problem/Need I: Mental Health.    Problem: Bipolar disorder, anxiety and depression.    goal (In patient's words): "i want my mood stability and i want to go back to how i used to be happy and healthy".    Objective A: Patient will note improved awareness of triggers that result in ineffective thought, mood, behavior changes and report to the therapist and psychiatrist during sessions.    Objective B: Patient will learn to use coping skills to help manage depression and anxiety and will lower the PHQ and ARETHA 7 score by 3 points until the next treatment plan.     Problem/Need II:  Domain for Problem/Need II: Physical Health.  Problem: Bipolar disorder, anxiety and depression, cholesterol.  Objective A: Maintain annual physical and blood work, attend all appointments and take medications as prescribed.  Objective B: Go outside a much as you can, stay physical by going out for walks daily  [Cognitive and/or Behavior Therapy] : Cognitive and/or Behavior Therapy  [Motivational Interviewing] : Motivational Interviewing  [Psychoeducation] : Psychoeducation  [Supportive Therapy] : Supportive Therapy [de-identified] : Session began at 9am and ended at 9:30am.The patient reported no changes or concerns regarding her thyroid medication, although she noted feeling energetic. She denied any negative thoughts, self-harming behaviors, or suicidal/homicidal ideations during today's session. She reported engaging in various activities to support her well-being, including walking 2-3 times a day, spending quality time with family and friends, cooking for her family, shopping, and attending Rastafarian every Friday with a group of friends. During our sessions, the patient is consistently commended for her motivation and commitment to staying active and involved in her community, as these activities are crucial for maintaining stability. She is adhering to her medication regimen as prescribed.  Therapeutic techniques employed today included cognitive-behavioral therapy (CBT), supportive therapy, and reflective listening. Overall, she is managing well and looking forward to continuing her activities and plans for the summer. [FreeTextEntry1] : Patient agrees to come to weekly-biweekly sessions.

## 2025-06-24 NOTE — PLAN
[FreeTextEntry2] : Domain for Problem/Need I: Mental Health.    Problem: Bipolar disorder, anxiety and depression.    goal (In patient's words): "i want my mood stability and i want to go back to how i used to be happy and healthy".    Objective A: Patient will note improved awareness of triggers that result in ineffective thought, mood, behavior changes and report to the therapist and psychiatrist during sessions.    Objective B: Patient will learn to use coping skills to help manage depression and anxiety and will lower the PHQ and ARETHA 7 score by 3 points until the next treatment plan.     Problem/Need II:  Domain for Problem/Need II: Physical Health.  Problem: Bipolar disorder, anxiety and depression, cholesterol.  Objective A: Maintain annual physical and blood work, attend all appointments and take medications as prescribed.  Objective B: Go outside a much as you can, stay physical by going out for walks daily  [Cognitive and/or Behavior Therapy] : Cognitive and/or Behavior Therapy  [Motivational Interviewing] : Motivational Interviewing  [Psychoeducation] : Psychoeducation  [Supportive Therapy] : Supportive Therapy [de-identified] : Session began at 9am and ended at 9:30am.The patient reported no changes or concerns regarding her thyroid medication, although she noted feeling energetic. She denied any negative thoughts, self-harming behaviors, or suicidal/homicidal ideations during today's session. She reported engaging in various activities to support her well-being, including walking 2-3 times a day, spending quality time with family and friends, cooking for her family, shopping, and attending [employer] every Friday with a group of friends. During our sessions, the patient is consistently commended for her motivation and commitment to staying active and involved in her community, as these activities are crucial for maintaining stability. She is adhering to her medication regimen as prescribed.  Therapeutic techniques employed today included cognitive-behavioral therapy (CBT), supportive therapy, and reflective listening. Overall, she is managing well and looking forward to continuing her activities and plans for the summer. [FreeTextEntry1] : Patient agrees to come to weekly-biweekly sessions.

## 2025-06-24 NOTE — PHYSICAL EXAM
[Cooperative] : cooperative [Anxious] : anxious [Full] : full [Clear] : clear [Racing] : racing [Preoccupations/Ruminations] : preoccupations/ruminations [Average] : average [WNL] : within normal limits [Mild] : mild [5 - Markedly ill] : 5 - Markedly ill  (intrusive symptoms that distinctly impair social/occupational function or cause intrusive levels of distress) [4 - No change] : 4 - No change  (symptoms remain essentially unchanged) [Individual reports tobacco use during the last 30 days?] : Individual reports tobacco use during the last 30 days? Yes [Individual reports use of the following tobacco products during the last 30 days?] : Individual reports use of the following tobacco products during the last 30 days? Yes -  [Cigarettes] : Cigarettes [Individual reports current use of tobacco cessation medication or nicotine replacement therapy?] : Individual reports current use of tobacco cessation medication or nicotine replacement therapy? No [Was tobacco cessation medication and/or nicotine replacement therapy recommended?] : Was tobacco cessation medication and/or nicotine replacement therapy recommended? Yes [Does individual accept referral to MD for cessation medication or NRT?] : Does individual accept referral to MD for cessation medication or NRT? No

## 2025-07-10 NOTE — SOCIAL HISTORY
[FreeTextEntry1] : Pt is Maltese, , with 4 children (son-34yo, daughters- 31, 29, 26) domiciled at home with her  and her 28 y/o daughter, with her 32 y/o daughter living upstairs, Moved from [facility] 34 years ago
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
## 2025-07-10 NOTE — PHYSICAL EXAM
[Well groomed] : well groomed [Cooperative] : cooperative [Euthymic] : euthymic [Full] : full [Clear] : clear [Linear/Goal Directed] : linear/goal directed [None] : none [None Reported] : none reported [WNL] : within normal limits [Average] : average [FreeTextEntry3] : Neck FROM , no rigidity noted in any extremities / neck.  [de-identified] : fair [de-identified] : fair

## 2025-07-10 NOTE — DISCUSSION/SUMMARY
[FreeTextEntry1] : Contacted the patient, patient's daughter Teri, answered the phone [phone]. I told her that the neck stiffness is unlikely from Seroquel; however, in case it is, patient can try taking Cogentin 0.5mg PO qhs. Teri initially did not want more medication due to its own side effects (drowsiness), but I told her we will still prescribe it so that patient can take it if the neck stiffness does not resolve on its own, or if it gets worse. Daughter showed understanding.
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
## 2025-07-10 NOTE — FAMILY HISTORY
[FreeTextEntry1] : Family hx of patient's niece who is dealing with "mental health" issue Denies all other family history of suicide, substance abuse Opt out

## 2025-07-10 NOTE — SOCIAL HISTORY
[FreeTextEntry1] : Pt is Greek, , with 4 children (son-32yo, daughters- 31, 29, 26) domiciled at home with her  and her 30 y/o daughter, with her 32 y/o daughter living upstairs, Moved from [facility] 34 years ago

## 2025-07-10 NOTE — PHYSICAL EXAM
[Well groomed] : well groomed [Cooperative] : cooperative [Euthymic] : euthymic [Full] : full [Clear] : clear [Linear/Goal Directed] : linear/goal directed [None] : none [None Reported] : none reported [WNL] : within normal limits [Average] : average [FreeTextEntry3] : Neck FROM , no rigidity noted in any extremities / neck.  [de-identified] : fair [de-identified] : fair

## 2025-07-10 NOTE — DISCUSSION/SUMMARY
[FreeTextEntry1] : Contacted the patient, patient's daughter Teri, answered the phone [phone]. I told her that the neck stiffness is unlikely from Seroquel; however, in case it is, patient can try taking Cogentin 0.5mg PO qhs. Teri initially did not want more medication due to its own side effects (drowsiness), but I told her we will still prescribe it so that patient can take it if the neck stiffness does not resolve on its own, or if it gets worse. Daughter showed understanding.

## 2025-07-18 NOTE — PLAN
[Cognitive and/or Behavior Therapy] : Cognitive and/or Behavior Therapy  [Motivational Interviewing] : Motivational Interviewing  [Psychoeducation] : Psychoeducation  [Supportive Therapy] : Supportive Therapy [FreeTextEntry2] : Domain for Problem/Need I: Mental Health.    Problem: Bipolar disorder, anxiety and depression.    goal (In patient's words): "i want my mood stability and i want to go back to how i used to be happy and healthy".    Objective A: Patient will note improved awareness of triggers that result in ineffective thought, mood, behavior changes and report to the therapist and psychiatrist during sessions.    Objective B: Patient will learn to use coping skills to help manage depression and anxiety and will lower the PHQ and ARETHA 7 score by 3 points until the next treatment plan.     Problem/Need II:  Domain for Problem/Need II: Physical Health.  Problem: Bipolar disorder, anxiety and depression, cholesterol.  Objective A: Maintain annual physical and blood work, attend all appointments and take medications as prescribed.  Objective B: Go outside a much as you can, stay physical by going out for walks daily  [de-identified] : Session began at 9:15am and ended at 9:45am.The patient reported no changes or concerns regarding her thyroid medication, although she noted feeling energetic. She denied any negative thoughts, self-harming behaviors, or suicidal/homicidal ideations during today's session. It was the patient's birthday today, and I wished her a happy birthday. She was in good spirits and planned to spend the day at the beach with her son and grandchildren. Overall, she reports doing well but continues to struggle with feelings of jealousy toward others. Despite acknowledging her own fortunate circumstances--including paid-off houses, her 's successful business, her children's college graduations, supportive family, and grandchildren she cannot explain this persistent jealousy. She is actively trying to dispel these thoughts and focus on herself, which has yielded some improvement, but she desires complete resolution. Otherwise, the patient is doing well, taking her medication as prescribed, and reports a positive first impression of her new psychiatrist, Dr. Willoughby. She had no other concerns. Therapeutic techniques employed today included cognitive-behavioral therapy (CBT), supportive therapy, and reflective listening. Overall, she is managing well and looking forward to continuing her activities and plans for the summer. [FreeTextEntry1] : Patient agrees to come to weekly-biweekly sessions.

## 2025-07-18 NOTE — PLAN
[Cognitive and/or Behavior Therapy] : Cognitive and/or Behavior Therapy  [Motivational Interviewing] : Motivational Interviewing  [Psychoeducation] : Psychoeducation  [Supportive Therapy] : Supportive Therapy [FreeTextEntry2] : Domain for Problem/Need I: Mental Health.    Problem: Bipolar disorder, anxiety and depression.    goal (In patient's words): "i want my mood stability and i want to go back to how i used to be happy and healthy".    Objective A: Patient will note improved awareness of triggers that result in ineffective thought, mood, behavior changes and report to the therapist and psychiatrist during sessions.    Objective B: Patient will learn to use coping skills to help manage depression and anxiety and will lower the PHQ and ARETHA 7 score by 3 points until the next treatment plan.     Problem/Need II:  Domain for Problem/Need II: Physical Health.  Problem: Bipolar disorder, anxiety and depression, cholesterol.  Objective A: Maintain annual physical and blood work, attend all appointments and take medications as prescribed.  Objective B: Go outside a much as you can, stay physical by going out for walks daily  [de-identified] : Session began at 9:15am and ended at 9:45am.The patient reported no changes or concerns regarding her thyroid medication, although she noted feeling energetic. She denied any negative thoughts, self-harming behaviors, or suicidal/homicidal ideations during today's session. It was the patient's birthday today, and I wished her a happy birthday. She was in good spirits and planned to spend the day at the beach with her son and grandchildren. Overall, she reports doing well but continues to struggle with feelings of jealousy toward others. Despite acknowledging her own fortunate circumstances--including paid-off houses, her 's successful business, her children's college graduations, supportive family, and grandchildren she cannot explain this persistent jealousy. She is actively trying to dispel these thoughts and focus on herself, which has yielded some improvement, but she desires complete resolution. Otherwise, the patient is doing well, taking her medication as prescribed, and reports a positive first impression of her new psychiatrist, Dr. Willoughby. She had no other concerns. Therapeutic techniques employed today included cognitive-behavioral therapy (CBT), supportive therapy, and reflective listening. Overall, she is managing well and looking forward to continuing her activities and plans for the summer. [FreeTextEntry1] : Patient agrees to come to weekly-biweekly sessions.

## 2025-07-25 NOTE — PHYSICAL EXAM
[Well groomed] : well groomed [Cooperative] : cooperative [Euthymic] : euthymic [Full] : full [Clear] : clear [Linear/Goal Directed] : linear/goal directed [None] : none [None Reported] : none reported [WNL] : within normal limits [Average] : average [FreeTextEntry3] : Neck FROM , no rigidity noted in any extremities / neck.  [de-identified] : fair [de-identified] : fair
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
